# Patient Record
Sex: FEMALE | Race: WHITE | Employment: OTHER | URBAN - METROPOLITAN AREA
[De-identification: names, ages, dates, MRNs, and addresses within clinical notes are randomized per-mention and may not be internally consistent; named-entity substitution may affect disease eponyms.]

---

## 2017-02-21 ENCOUNTER — TELEPHONE (OUTPATIENT)
Dept: FAMILY MEDICINE CLINIC | Facility: CLINIC | Age: 62
End: 2017-02-21

## 2017-02-21 DIAGNOSIS — J32.0 CHRONIC MAXILLARY SINUSITIS: Primary | ICD-10-CM

## 2017-02-21 RX ORDER — AMOXICILLIN AND CLAVULANATE POTASSIUM 875; 125 MG/1; MG/1
1 TABLET, FILM COATED ORAL 2 TIMES DAILY
Qty: 20 TABLET | Refills: 0 | Status: SHIPPED | OUTPATIENT
Start: 2017-02-21 | End: 2017-03-03

## 2017-02-21 RX ORDER — METHYLPREDNISOLONE 4 MG/1
TABLET ORAL
Qty: 1 KIT | Refills: 0 | Status: SHIPPED | OUTPATIENT
Start: 2017-02-21 | End: 2017-05-31 | Stop reason: ALTCHOICE

## 2017-02-21 NOTE — TELEPHONE ENCOUNTER
Patient is in [facility] and wants a antibiotic called into a pharmacy in [address] [place] [address].

## 2017-02-21 NOTE — TELEPHONE ENCOUNTER
Call pt that her Augmentin and Medrol Dosepack were sent to [place] in [facility].  Dr. Dinh Camargo

## 2017-04-19 ENCOUNTER — OFFICE VISIT (OUTPATIENT)
Dept: FAMILY MEDICINE CLINIC | Facility: CLINIC | Age: 62
End: 2017-04-19

## 2017-04-19 VITALS
TEMPERATURE: 98 F | DIASTOLIC BLOOD PRESSURE: 80 MMHG | BODY MASS INDEX: 30.73 KG/M2 | WEIGHT: 180 LBS | HEIGHT: 64 IN | OXYGEN SATURATION: 99 % | SYSTOLIC BLOOD PRESSURE: 124 MMHG | RESPIRATION RATE: 16 BRPM | HEART RATE: 76 BPM

## 2017-04-19 DIAGNOSIS — E78.5 DYSLIPIDEMIA: ICD-10-CM

## 2017-04-19 DIAGNOSIS — B96.89 ACUTE BACTERIAL PHARYNGITIS: ICD-10-CM

## 2017-04-19 DIAGNOSIS — J32.0 SINUSITIS, MAXILLARY, CHRONIC: ICD-10-CM

## 2017-04-19 DIAGNOSIS — Z76.0 MEDICATION REFILL: ICD-10-CM

## 2017-04-19 DIAGNOSIS — J30.2 SEASONAL ALLERGIC RHINITIS, UNSPECIFIED ALLERGIC RHINITIS TRIGGER: ICD-10-CM

## 2017-04-19 DIAGNOSIS — J01.01 ACUTE RECURRENT MAXILLARY SINUSITIS: Primary | ICD-10-CM

## 2017-04-19 DIAGNOSIS — R00.0 TACHYCARDIA: ICD-10-CM

## 2017-04-19 DIAGNOSIS — J02.8 ACUTE BACTERIAL PHARYNGITIS: ICD-10-CM

## 2017-04-19 DIAGNOSIS — J33.9 NASAL POLYPS: ICD-10-CM

## 2017-04-19 PROCEDURE — 99214 OFFICE O/P EST MOD 30 MIN: CPT | Performed by: FAMILY MEDICINE

## 2017-04-19 RX ORDER — MONTELUKAST SODIUM 10 MG/1
10 TABLET ORAL NIGHTLY
Qty: 90 TABLET | Refills: 1 | Status: SHIPPED | OUTPATIENT
Start: 2017-04-19 | End: 2018-02-20

## 2017-04-19 RX ORDER — AMOXICILLIN AND CLAVULANATE POTASSIUM 500; 125 MG/1; MG/1
1 TABLET, FILM COATED ORAL 2 TIMES DAILY
Qty: 20 TABLET | Refills: 1 | Status: SHIPPED | OUTPATIENT
Start: 2017-04-19 | End: 2017-04-29

## 2017-04-19 RX ORDER — METHYLPREDNISOLONE 4 MG/1
TABLET ORAL
Qty: 1 KIT | Refills: 1 | Status: SHIPPED | OUTPATIENT
Start: 2017-04-19 | End: 2017-05-31 | Stop reason: ALTCHOICE

## 2017-04-19 NOTE — PATIENT INSTRUCTIONS
Yessi you were seen for many issues. Treat the sinus infection with Augmentin and Medrol Dosepack. See Dr. Markel Jackson as well. Singulair was refilled as well. See cardiology for the tachycardia.   See me if not better in a few weeks and every 6 months for ma Treatment is aimed at unblocking the sinus opening and helping the cilia work again. You may need to take antihistamine and decongestant medicine. These can reduce inflammation and decrease the amount of fluid your sinuses make.  If you have a bacterial inf Diagnosing chronic sinusitis  Your doctor will ask about your symptoms and health history. The doctor will look at your nose and face. You may have imaging studies such as an X-ray or CT scan of the sinuses.  Your doctor may also take a sample of the draina When traveling on an airplane, use saline nasal spray to keep your sinuses moist. Drink plenty of fluids. You may also want to take a decongestant before you get on the plane. Prevent colds  Do what you can to avoid being exposed to colds and flu.  When p © 7011-4423 [address], [address]. All rights reserved. This information is not intended as a substitute for professional medical care. Always follow your healthcare professional's instructions.

## 2017-04-19 NOTE — PROGRESS NOTES
Judy Jones is a 64year old female. HPI:   Pt states she again has a flare up of sinuses, acute ear pain, nasal mucous production this past week. Ears popping more as well. Sinus headaches with rainy weather as well. Has chronic sinus history.  Told by REVIEW OF SYSTEMS:   GENERAL HEALTH: feels sick again today with possibly another sinus infection--ears popping and HR went up when exercising this past week--see HPI notes above for further details  SKIN: denies any unusual skin lesions or rashes  CARLOS MANUEL mouth 2 (two) times daily. -     methylPREDNISolone (MEDROL) 4 MG Oral Tablet Therapy Pack; As directed. Sinusitis, maxillary, chronic  -     Amoxicillin-Pot Clavulanate 500-125 MG Oral Tab; Take 1 tablet by mouth 2 (two) times daily.   -     methylPRED

## 2017-05-08 ENCOUNTER — TELEPHONE (OUTPATIENT)
Dept: FAMILY MEDICINE CLINIC | Facility: CLINIC | Age: 62
End: 2017-05-08

## 2017-05-08 DIAGNOSIS — E78.5 DYSLIPIDEMIA: Primary | ICD-10-CM

## 2017-05-08 DIAGNOSIS — R00.0 TACHYCARDIA, UNSPECIFIED: ICD-10-CM

## 2017-05-12 NOTE — TELEPHONE ENCOUNTER
Quick Note:    Call pt with stable lipid panel and CMP--repeat in 6 to 12 months and order for pt.  Dr. Dinh Camargo    ______

## 2017-05-15 ENCOUNTER — TELEPHONE (OUTPATIENT)
Dept: FAMILY MEDICINE CLINIC | Facility: CLINIC | Age: 62
End: 2017-05-15

## 2017-05-15 NOTE — TELEPHONE ENCOUNTER
Quick Note:    LM for patient with lab results. Patient instructed to call our office if they have any questions.   ______

## 2017-08-28 ENCOUNTER — OFFICE VISIT (OUTPATIENT)
Dept: FAMILY MEDICINE CLINIC | Facility: CLINIC | Age: 62
End: 2017-08-28

## 2017-08-28 VITALS
WEIGHT: 181 LBS | OXYGEN SATURATION: 98 % | RESPIRATION RATE: 16 BRPM | SYSTOLIC BLOOD PRESSURE: 122 MMHG | TEMPERATURE: 98 F | HEART RATE: 86 BPM | HEIGHT: 65 IN | BODY MASS INDEX: 30.16 KG/M2 | DIASTOLIC BLOOD PRESSURE: 76 MMHG

## 2017-08-28 DIAGNOSIS — B37.3 VAGINAL CANDIDIASIS: ICD-10-CM

## 2017-08-28 DIAGNOSIS — J01.01 ACUTE RECURRENT MAXILLARY SINUSITIS: Primary | ICD-10-CM

## 2017-08-28 DIAGNOSIS — J30.89 ALLERGIC RHINITIS DUE TO DUST MITE: ICD-10-CM

## 2017-08-28 DIAGNOSIS — H69.83 DYSFUNCTION OF BOTH EUSTACHIAN TUBES: Chronic | ICD-10-CM

## 2017-08-28 DIAGNOSIS — Z71.84 TRAVEL ADVICE ENCOUNTER: ICD-10-CM

## 2017-08-28 DIAGNOSIS — J33.9 NASAL POLYPS: ICD-10-CM

## 2017-08-28 DIAGNOSIS — J32.0 CHRONIC MAXILLARY SINUSITIS: ICD-10-CM

## 2017-08-28 PROCEDURE — 99213 OFFICE O/P EST LOW 20 MIN: CPT | Performed by: FAMILY MEDICINE

## 2017-08-28 RX ORDER — PREDNISONE 20 MG/1
TABLET ORAL
Qty: 18 TABLET | Refills: 0 | Status: SHIPPED | OUTPATIENT
Start: 2017-08-28 | End: 2017-09-08 | Stop reason: ALTCHOICE

## 2017-08-28 RX ORDER — CEFDINIR 300 MG/1
300 CAPSULE ORAL 2 TIMES DAILY
Qty: 28 CAPSULE | Refills: 1 | Status: SHIPPED | OUTPATIENT
Start: 2017-08-28 | End: 2017-09-08 | Stop reason: ALTCHOICE

## 2017-08-28 RX ORDER — FLUCONAZOLE 150 MG/1
150 TABLET ORAL ONCE
Qty: 1 TABLET | Refills: 0 | Status: SHIPPED | OUTPATIENT
Start: 2017-08-28 | End: 2017-08-28

## 2017-08-28 NOTE — PROGRESS NOTES
HPI:   Cindy Valle is a 58year old female who presents for upper respiratory symptoms for the past week and she knows she has another sinus infection.   She sees her ENT regularly for sinus infection management and call Dr. Maritza Kelley office and could not g Comment: arm, squamos cell carcinoma removal   Family History   Problem Relation Age of Onset   • Thyroid Disorder Mother    • Heart Disease Mother    • Lipids Mother    • Breast Cancer Mother 55   • Heart Disease Father    • Cancer Father      liver each eardrum that is clear and bulging bilateral; moderate bilateral pharyngeal erythema, moderate bilateral maxillary sinus pressure on palpation; nasal congestion noted with edema and erythema of the nasal turbinates bilateral  NECK: supple,no adenopathy above     Nasal polyps  -     predniSONE 20 MG Oral Tab; Take 3 tablets daily by mouth for 3 days, then take 2 tablets daily by mouth for 3 days, then take 1 tablet daily by mouth for 3 days.     Allergic rhinitis due to dust mite  -     predniSONE 20 MG Or

## 2017-09-07 ENCOUNTER — TELEPHONE (OUTPATIENT)
Dept: FAMILY MEDICINE CLINIC | Facility: CLINIC | Age: 62
End: 2017-09-07

## 2017-09-07 NOTE — TELEPHONE ENCOUNTER
I spoke to patient and advised she follow up with Dr Jayy Mckeon, she said she cannot get in for 3 weeks, I said she would need to be seen to refill the Prednisone, she scheduled with Dr Soto Dupree for tomorrow.

## 2017-09-07 NOTE — TELEPHONE ENCOUNTER
Pt was seen a couple weeks ago for an infection, and was given medication.  Pt would like the prednisone refilled, if not she can make an appt

## 2017-09-08 ENCOUNTER — OFFICE VISIT (OUTPATIENT)
Dept: FAMILY MEDICINE CLINIC | Facility: CLINIC | Age: 62
End: 2017-09-08

## 2017-09-08 VITALS
RESPIRATION RATE: 16 BRPM | HEART RATE: 72 BPM | BODY MASS INDEX: 30.16 KG/M2 | SYSTOLIC BLOOD PRESSURE: 118 MMHG | TEMPERATURE: 99 F | DIASTOLIC BLOOD PRESSURE: 76 MMHG | WEIGHT: 181 LBS | OXYGEN SATURATION: 99 % | HEIGHT: 65 IN

## 2017-09-08 DIAGNOSIS — R09.81 SINUS CONGESTION: Primary | ICD-10-CM

## 2017-09-08 PROCEDURE — 99213 OFFICE O/P EST LOW 20 MIN: CPT | Performed by: FAMILY MEDICINE

## 2017-09-08 RX ORDER — GUAIFENESIN 400 MG/1
400 TABLET ORAL EVERY 6 HOURS PRN
Qty: 30 TABLET | Refills: 0 | Status: SHIPPED | OUTPATIENT
Start: 2017-09-08 | End: 2017-09-15

## 2017-09-08 NOTE — PROGRESS NOTES
HPI:   Alban Bianchi is a 58year old female that presents for sinus congestion. This is an ongoing and chronic condition, and she follows with ENT. She was seen by PCP for sinusitis 10 days ago, she received cefdinir for 10 days and prednisone.   She sta 4 quadrants, no masses, no hepatosplenomegaly. EXTREMITIES:  No edema, no cyanosis, no clubbing, FROM, 2+ dorsalis pedis pulses bilaterally. NEURO:  No deficit, normal gait, strength and tone, sensory intact, normal reflexes.     ASSESSMENT AND PLAN:

## 2017-09-27 PROCEDURE — 88175 CYTOPATH C/V AUTO FLUID REDO: CPT | Performed by: OBSTETRICS & GYNECOLOGY

## 2017-09-27 PROCEDURE — 87624 HPV HI-RISK TYP POOLED RSLT: CPT | Performed by: OBSTETRICS & GYNECOLOGY

## 2017-11-08 DIAGNOSIS — E78.5 DYSLIPIDEMIA: ICD-10-CM

## 2017-11-08 DIAGNOSIS — Z76.0 MEDICATION REFILL: ICD-10-CM

## 2017-11-08 RX ORDER — ATORVASTATIN CALCIUM 10 MG/1
10 TABLET, FILM COATED ORAL NIGHTLY
Qty: 90 TABLET | Refills: 1 | Status: SHIPPED | OUTPATIENT
Start: 2017-11-08 | End: 2017-11-10

## 2017-11-08 NOTE — TELEPHONE ENCOUNTER
Received request from [facility] for new prescription for Atorvastatin 10 mg tablets for quantity of 90. Reference #[id number].

## 2017-11-10 ENCOUNTER — TELEPHONE (OUTPATIENT)
Dept: FAMILY MEDICINE CLINIC | Facility: CLINIC | Age: 62
End: 2017-11-10

## 2017-11-10 DIAGNOSIS — Z76.0 MEDICATION REFILL: ICD-10-CM

## 2017-11-10 DIAGNOSIS — E78.5 DYSLIPIDEMIA: ICD-10-CM

## 2017-11-10 RX ORDER — ATORVASTATIN CALCIUM 10 MG/1
10 TABLET, FILM COATED ORAL NIGHTLY
Qty: 90 TABLET | Refills: 1 | Status: SHIPPED | OUTPATIENT
Start: 2017-11-10 | End: 2018-07-05

## 2017-11-14 DIAGNOSIS — E78.5 DYSLIPIDEMIA: ICD-10-CM

## 2017-11-14 DIAGNOSIS — Z76.0 MEDICATION REFILL: ICD-10-CM

## 2017-11-15 RX ORDER — ATORVASTATIN CALCIUM 10 MG/1
10 TABLET, FILM COATED ORAL NIGHTLY
Qty: 90 TABLET | Refills: 1 | Status: CANCELLED | OUTPATIENT
Start: 2017-11-15

## 2017-11-15 NOTE — TELEPHONE ENCOUNTER
Pt was notified to contact [facility] to verify information prior to releasing the medication

## 2017-11-15 NOTE — TELEPHONE ENCOUNTER
Spoke with Jose Araujo from [employer] and she advised that pt needs to contact them to verify home address and information and to go over copay payment arrangement.  Called patient and left a message stating to give the office a call back so we can let her

## 2017-11-15 NOTE — TELEPHONE ENCOUNTER
Patient states that [facility] did not receive her order for Atorvastatin. Patient only has two left. Please resend.

## 2017-11-15 NOTE — TELEPHONE ENCOUNTER
MAR Comment Waste Waste Unit        Medication Detail      Disp Refills Start End    atorvastatin 10 MG Oral Tab 90 tablet 1 11/10/2017     Sig - Route: Take 1 tablet (10 mg total) by mouth nightly.  - Oral    E-Prescribing Status: Receipt confirmed by phar

## 2018-02-20 DIAGNOSIS — Z76.0 MEDICATION REFILL: ICD-10-CM

## 2018-02-20 NOTE — TELEPHONE ENCOUNTER
Requesting Montelukast  LOV: 9/8/17 acute    RTC: prn  Last Relevant Labs: used for allergic rhinitis  Filled: 4/19/17 #90 with 1 refills    Future Appointments  Date Time Provider Mike Molina   4/16/2018 9:30 AM Pamela Anaya MD [facility] [address] [facility] [address]

## 2018-02-20 NOTE — TELEPHONE ENCOUNTER
Pt was [facility] and wants to be with Faiza Yap now. Future Appointments  Date Time Provider Mike Tracy   4/16/2018 9:30 AM Rena Bryson MD [facility] [address] [facility]     She will be leaving for [place] and needs Montelukast Sodium 10 MG Oral Tab.     Plea

## 2018-02-21 RX ORDER — MONTELUKAST SODIUM 10 MG/1
10 TABLET ORAL NIGHTLY
Qty: 90 TABLET | Refills: 1 | Status: SHIPPED | OUTPATIENT
Start: 2018-02-21 | End: 2018-09-04

## 2018-04-16 ENCOUNTER — OFFICE VISIT (OUTPATIENT)
Dept: FAMILY MEDICINE CLINIC | Facility: CLINIC | Age: 63
End: 2018-04-16

## 2018-04-16 VITALS
TEMPERATURE: 98 F | DIASTOLIC BLOOD PRESSURE: 80 MMHG | OXYGEN SATURATION: 98 % | BODY MASS INDEX: 31.11 KG/M2 | HEIGHT: 63.75 IN | HEART RATE: 82 BPM | SYSTOLIC BLOOD PRESSURE: 124 MMHG | RESPIRATION RATE: 12 BRPM | WEIGHT: 180 LBS

## 2018-04-16 DIAGNOSIS — E78.00 HIGH CHOLESTEROL: ICD-10-CM

## 2018-04-16 DIAGNOSIS — J01.00 ACUTE NON-RECURRENT MAXILLARY SINUSITIS: Primary | ICD-10-CM

## 2018-04-16 DIAGNOSIS — J40 BRONCHITIS: ICD-10-CM

## 2018-04-16 DIAGNOSIS — J30.89 ALLERGIC RHINITIS DUE TO DUST MITE: ICD-10-CM

## 2018-04-16 DIAGNOSIS — Z72.0 TOBACCO USE: ICD-10-CM

## 2018-04-16 DIAGNOSIS — M54.9 UPPER BACK PAIN: ICD-10-CM

## 2018-04-16 PROCEDURE — 99214 OFFICE O/P EST MOD 30 MIN: CPT | Performed by: FAMILY MEDICINE

## 2018-04-16 RX ORDER — AMOXICILLIN AND CLAVULANATE POTASSIUM 500; 125 MG/1; MG/1
1 TABLET, FILM COATED ORAL 2 TIMES DAILY
Qty: 20 TABLET | Refills: 0 | Status: SHIPPED | OUTPATIENT
Start: 2018-04-16 | End: 2018-09-27

## 2018-04-16 NOTE — PROGRESS NOTES
HPI:    Patient ID: Julia Salgado is a 58year old female.     HPI  Patient presents with:  Medication Follow-Up  Sinus Problem: possible sinus infection; pressure and pain; 1 month  Nasal Congestion  Back Pain: upper pain  Nicotine Dependence: pt would lik non-recurrent maxillary sinusitis  (primary encounter diagnosis)  Bronchitis  Upper back pain  Tobacco use  High cholesterol  Allergic rhinitis due to dust mite  For allergic rhinitis we will refill Singulair. Counseled at length regarding this.     Sinusi

## 2018-06-01 ENCOUNTER — PATIENT MESSAGE (OUTPATIENT)
Dept: FAMILY MEDICINE CLINIC | Facility: CLINIC | Age: 63
End: 2018-06-01

## 2018-06-01 NOTE — TELEPHONE ENCOUNTER
From: Lizzie Opitz  To: Man Pineda MD  Sent: 6/1/2018 3:48 PM CDT  Subject: Test Results Question    Hello. I had lab work done at [facility] 3 days ago. Are the results available yet?

## 2018-07-03 ENCOUNTER — PATIENT MESSAGE (OUTPATIENT)
Dept: FAMILY MEDICINE CLINIC | Facility: CLINIC | Age: 63
End: 2018-07-03

## 2018-07-03 DIAGNOSIS — Z76.0 MEDICATION REFILL: ICD-10-CM

## 2018-07-03 DIAGNOSIS — E78.5 DYSLIPIDEMIA: ICD-10-CM

## 2018-07-03 RX ORDER — ATORVASTATIN CALCIUM 10 MG/1
10 TABLET, FILM COATED ORAL NIGHTLY
Qty: 90 TABLET | Refills: 1 | Status: CANCELLED | OUTPATIENT
Start: 2018-07-03

## 2018-07-03 NOTE — TELEPHONE ENCOUNTER
From: Shanti Dixon  To: Amanda Madison MD  Sent: 7/3/2018 11:02 AM CDT  Subject: Prescription Question     good morning. My prescription for Atorvastatin does not appear among the list of meds in my chart.  The original prescription ordered by Dr. Last Valerio

## 2018-09-04 DIAGNOSIS — Z76.0 MEDICATION REFILL: ICD-10-CM

## 2018-09-04 RX ORDER — MONTELUKAST SODIUM 10 MG/1
TABLET ORAL
Qty: 30 TABLET | Refills: 0 | Status: SHIPPED | OUTPATIENT
Start: 2018-09-04 | End: 2018-09-27

## 2018-09-04 RX ORDER — MONTELUKAST SODIUM 10 MG/1
10 TABLET ORAL NIGHTLY
Qty: 90 TABLET | Refills: 1 | Status: SHIPPED | OUTPATIENT
Start: 2018-09-04 | End: 2018-09-27

## 2018-09-04 NOTE — TELEPHONE ENCOUNTER
Patient will need refill on Montelukast 10 mg sent to [facility] mail order pharmacy. Patient would appreciate this being refilled for 6 months.

## 2018-09-04 NOTE — TELEPHONE ENCOUNTER
Requesting Montelukast  LOV: 4/16/18  RTC: not noted  Last Relevant Labs: used for allergic rhinitis  Filled: 2/21/18 #90 with 1 refills    Future Appointments  Date Time Provider Mike Molina   9/27/2018 9:45 AM Ronnie Ramachandran MD [facility] [address] [facility] [address]

## 2018-09-04 NOTE — TELEPHONE ENCOUNTER
Requested Medications   MONTELUKAST TAB 10MG  Will file in chart as: MONTELUKAST SODIUM 10 MG Oral Tab  TAKE 1 TABLET NIGHTLY       Disp: 90 tablet Refills: 1    Class: Normal Start: 9/4/2018   For: Medication refill  Originally ordered: 4 years ago by [employer]

## 2018-09-04 NOTE — TELEPHONE ENCOUNTER
Patient aware and will keep appt for further refills  Future Appointments  Date Time Provider Mike Molina   9/27/2018 9:45 AM Lucas Lizama MD [facility] [address] [facility] [address]

## 2018-09-27 ENCOUNTER — OFFICE VISIT (OUTPATIENT)
Dept: FAMILY MEDICINE CLINIC | Facility: CLINIC | Age: 63
End: 2018-09-27
Payer: COMMERCIAL

## 2018-09-27 VITALS
TEMPERATURE: 98 F | RESPIRATION RATE: 12 BRPM | SYSTOLIC BLOOD PRESSURE: 118 MMHG | WEIGHT: 180 LBS | BODY MASS INDEX: 31.11 KG/M2 | DIASTOLIC BLOOD PRESSURE: 70 MMHG | HEIGHT: 63.75 IN | HEART RATE: 73 BPM | OXYGEN SATURATION: 98 %

## 2018-09-27 DIAGNOSIS — Z12.39 SCREENING FOR MALIGNANT NEOPLASM OF BREAST: ICD-10-CM

## 2018-09-27 DIAGNOSIS — Z00.00 ROUTINE GENERAL MEDICAL EXAMINATION AT A HEALTH CARE FACILITY: Primary | ICD-10-CM

## 2018-09-27 DIAGNOSIS — J30.89 ALLERGIC RHINITIS DUE TO DUST MITE: ICD-10-CM

## 2018-09-27 DIAGNOSIS — Z13.31 NEGATIVE DEPRESSION SCREENING: ICD-10-CM

## 2018-09-27 DIAGNOSIS — Z76.0 MEDICATION REFILL: ICD-10-CM

## 2018-09-27 DIAGNOSIS — J01.00 ACUTE NON-RECURRENT MAXILLARY SINUSITIS: ICD-10-CM

## 2018-09-27 DIAGNOSIS — R05.8 PRODUCTIVE COUGH: ICD-10-CM

## 2018-09-27 PROCEDURE — 99213 OFFICE O/P EST LOW 20 MIN: CPT | Performed by: FAMILY MEDICINE

## 2018-09-27 PROCEDURE — 99396 PREV VISIT EST AGE 40-64: CPT | Performed by: FAMILY MEDICINE

## 2018-09-27 RX ORDER — AMOXICILLIN AND CLAVULANATE POTASSIUM 500; 125 MG/1; MG/1
1 TABLET, FILM COATED ORAL 2 TIMES DAILY
Qty: 20 TABLET | Refills: 0 | Status: SHIPPED | OUTPATIENT
Start: 2018-09-27 | End: 2018-10-16

## 2018-09-27 RX ORDER — METHYLPREDNISOLONE 4 MG/1
TABLET ORAL
Qty: 1 KIT | Refills: 0 | Status: SHIPPED | OUTPATIENT
Start: 2018-09-27 | End: 2018-10-16

## 2018-09-27 RX ORDER — MONTELUKAST SODIUM 10 MG/1
10 TABLET ORAL NIGHTLY
Qty: 90 TABLET | Refills: 0 | Status: SHIPPED | OUTPATIENT
Start: 2018-09-27 | End: 2019-01-07

## 2018-09-27 RX ORDER — AZELASTINE 1 MG/ML
2 SPRAY, METERED NASAL 2 TIMES DAILY PRN
Qty: 3 BOTTLE | Refills: 3 | Status: SHIPPED | OUTPATIENT
Start: 2018-09-27 | End: 2019-05-22

## 2018-09-27 NOTE — PROGRESS NOTES
HPI:   Haleigh Mathew is a 61year old female who presents for a complete physical exam. Symptoms: denies discharge, itching, burning or dysuria. Patient has complaint of having persistent sinus drainage discharge cough congestion.   She is using nasal stero Rfl: 0   methylPREDNISolone (MEDROL) 4 MG Oral Tablet Therapy Pack As directed. Disp: 1 kit Rfl: 0   Montelukast Sodium 10 MG Oral Tab Take 1 tablet (10 mg total) by mouth nightly.  Disp: 90 tablet Rfl: 0   Azelastine HCl 0.1 % Nasal Solution 2 sprays by Na • Breast Cancer Paternal Cousin Male 61   • Ear Problems Neg    • Bleeding Disorders Neg    • Clotting Disorder Neg    • Anesthesia Problems  Neg       Social History:   Social History    Tobacco Use      Smoking status: Current Some Day Smoker        Pa no nipple dimpling or discharge.   LUNGS: clear to auscultation bilateral, no rales, rhonchi or wheezing  CARDIO: RRR without murmur normal S1S2  ABD:  normal bowel sounds,soft, non tender, no masses, HSM or tenderness  MUSCULOSKELETAL: gait roberta,l no cherelle general medical examination at a health care facility  -     CBC WITH DIFFERENTIAL WITH PLATELET  -     ASSAY, THYROID STIM HORMONE  -     FREE T4 (FREE THYROXINE)    Acute non-recurrent maxillary sinusitis    Productive cough    Negative depression screen

## 2018-09-28 LAB
ABSOLUTE BASOPHILS: 84 CELLS/UL (ref 0–200)
ABSOLUTE EOSINOPHILS: 258 CELLS/UL (ref 15–500)
ABSOLUTE LYMPHOCYTES: 2485 CELLS/UL (ref 850–3900)
ABSOLUTE MONOCYTES: 676 CELLS/UL (ref 200–950)
ABSOLUTE NEUTROPHILS: 4096 CELLS/UL (ref 1500–7800)
BASOPHILS: 1.1 %
EOSINOPHILS: 3.4 %
HEMATOCRIT: 42 % (ref 35–45)
HEMOGLOBIN: 14.3 G/DL (ref 11.7–15.5)
LYMPHOCYTES: 32.7 %
MCH: 31.9 PG (ref 27–33)
MCHC: 34 G/DL (ref 32–36)
MCV: 93.8 FL (ref 80–100)
MONOCYTES: 8.9 %
MPV: 10.4 FL (ref 7.5–12.5)
NEUTROPHILS: 53.9 %
PLATELET COUNT: 231 THOUSAND/UL (ref 140–400)
RDW: 12.4 % (ref 11–15)
RED BLOOD CELL COUNT: 4.48 MILLION/UL (ref 3.8–5.1)
T4, FREE: 1.1 NG/DL (ref 0.8–1.8)
TSH: 2.16 MIU/L (ref 0.4–4.5)
WHITE BLOOD CELL COUNT: 7.6 THOUSAND/UL (ref 3.8–10.8)

## 2018-10-09 ENCOUNTER — OFFICE VISIT (OUTPATIENT)
Dept: FAMILY MEDICINE CLINIC | Facility: CLINIC | Age: 63
End: 2018-10-09
Payer: COMMERCIAL

## 2018-10-09 VITALS
WEIGHT: 180 LBS | RESPIRATION RATE: 12 BRPM | BODY MASS INDEX: 31 KG/M2 | DIASTOLIC BLOOD PRESSURE: 72 MMHG | OXYGEN SATURATION: 100 % | SYSTOLIC BLOOD PRESSURE: 122 MMHG | HEART RATE: 72 BPM | TEMPERATURE: 98 F

## 2018-10-09 DIAGNOSIS — L03.114 CELLULITIS OF FOREARM, LEFT: ICD-10-CM

## 2018-10-09 DIAGNOSIS — Z91.038 ALLERGIC REACTION TO INSECT BITE: Primary | ICD-10-CM

## 2018-10-09 PROCEDURE — 99214 OFFICE O/P EST MOD 30 MIN: CPT | Performed by: FAMILY MEDICINE

## 2018-10-09 RX ORDER — PREDNISONE 20 MG/1
20 TABLET ORAL 2 TIMES DAILY
Qty: 10 TABLET | Refills: 0 | Status: SHIPPED | OUTPATIENT
Start: 2018-10-09 | End: 2018-10-14

## 2018-10-09 RX ORDER — CEPHALEXIN 500 MG/1
500 CAPSULE ORAL 2 TIMES DAILY
Qty: 14 CAPSULE | Refills: 0 | Status: SHIPPED | OUTPATIENT
Start: 2018-10-09 | End: 2018-10-16

## 2018-10-11 PROBLEM — L03.114 CELLULITIS OF FOREARM, LEFT: Status: ACTIVE | Noted: 2018-10-11

## 2018-10-11 PROBLEM — Z91.038 ALLERGIC REACTION TO INSECT BITE: Status: ACTIVE | Noted: 2018-10-11

## 2018-10-11 NOTE — PROGRESS NOTES
HPI:    Patient ID: Isaac Snow is a 61year old female. HPI  Patient presents with:   Insect Bite: left forearm- noticed yesterday  Sinusitis    Patient is here because she had an insect bite on her left forearm and she noticed a sting right away and distress  HENT:  normocephalic, atraumatic, external ear canals clear bilaterally, tympanic membranes appear opaque, non-bulging, non-erythematous, nasal turbinates are non-inflamed and not swollen, oropharynx without erythema, swelling, or exudate, gingiv

## 2018-10-16 ENCOUNTER — OFFICE VISIT (OUTPATIENT)
Dept: FAMILY MEDICINE CLINIC | Facility: CLINIC | Age: 63
End: 2018-10-16
Payer: COMMERCIAL

## 2018-10-16 VITALS
DIASTOLIC BLOOD PRESSURE: 78 MMHG | WEIGHT: 180 LBS | TEMPERATURE: 98 F | OXYGEN SATURATION: 98 % | SYSTOLIC BLOOD PRESSURE: 118 MMHG | HEIGHT: 63.75 IN | HEART RATE: 76 BPM | BODY MASS INDEX: 31.11 KG/M2 | RESPIRATION RATE: 12 BRPM

## 2018-10-16 DIAGNOSIS — Z91.038 ALLERGIC REACTION TO INSECT BITE: ICD-10-CM

## 2018-10-16 DIAGNOSIS — L03.114 CELLULITIS OF FOREARM, LEFT: Primary | ICD-10-CM

## 2018-10-16 DIAGNOSIS — J30.89 ALLERGIC RHINITIS DUE TO DUST MITE: ICD-10-CM

## 2018-10-16 PROCEDURE — 99214 OFFICE O/P EST MOD 30 MIN: CPT | Performed by: FAMILY MEDICINE

## 2018-10-16 NOTE — PROGRESS NOTES
HPI:    Patient ID: Haleigh Sin is a 61year old female. HPI  Patient is here for follow-up of possible cellulitis and allergic reaction to insect bite on the left forearm it has resolved now. She has finished prednisone.   She did develop some dizzin respiratory muscle use, no chest asymmetry, normal excursion. GI:  bowel sound positive in all four quadrants, abdomen is soft, non-tender, non-distended, no hepatosplenomegaly, no abnormal aortic pulsation.    NEURO: strength 5/5 all four extremities, sen

## 2018-10-23 ENCOUNTER — HOSPITAL ENCOUNTER (OUTPATIENT)
Dept: MAMMOGRAPHY | Age: 63
Discharge: HOME | End: 2018-10-23
Attending: FAMILY MEDICINE
Payer: COMMERCIAL

## 2018-10-23 DIAGNOSIS — Z12.39 SCREENING FOR MALIGNANT NEOPLASM OF BREAST: ICD-10-CM

## 2018-10-23 PROCEDURE — 77067 SCR MAMMO BI INCL CAD: CPT | Performed by: FAMILY MEDICINE

## 2018-10-26 DIAGNOSIS — Z12.31 ENCOUNTER FOR SCREENING MAMMOGRAM FOR MALIGNANT NEOPLASM OF BREAST: Primary | ICD-10-CM

## 2019-01-07 ENCOUNTER — TELEPHONE (OUTPATIENT)
Dept: FAMILY MEDICINE CLINIC | Facility: CLINIC | Age: 64
End: 2019-01-07

## 2019-01-07 DIAGNOSIS — Z76.0 MEDICATION REFILL: ICD-10-CM

## 2019-01-07 DIAGNOSIS — E78.5 DYSLIPIDEMIA: ICD-10-CM

## 2019-01-07 RX ORDER — ATORVASTATIN CALCIUM 10 MG/1
10 TABLET, FILM COATED ORAL NIGHTLY
Qty: 90 TABLET | Refills: 0 | Status: SHIPPED | OUTPATIENT
Start: 2019-01-07 | End: 2019-04-08

## 2019-01-07 RX ORDER — ATORVASTATIN CALCIUM 10 MG/1
TABLET, FILM COATED ORAL
Qty: 90 TABLET | Refills: 1 | Status: CANCELLED | OUTPATIENT
Start: 2019-01-07

## 2019-01-07 RX ORDER — MONTELUKAST SODIUM 10 MG/1
10 TABLET ORAL NIGHTLY
Qty: 90 TABLET | Refills: 0 | Status: SHIPPED | OUTPATIENT
Start: 2019-01-07 | End: 2019-09-24

## 2019-01-07 NOTE — TELEPHONE ENCOUNTER
atorvastatin 10 MG Oral Tab 90 tablet 1 7/5/2018     Sig - Route: Take 1 tablet (10 mg total) by mouth nightly. - Oral    Sent to pharmacy as:  Atorvastatin Calcium 10 MG Oral Tablet    E-Prescribing Status: Receipt confirmed by pharmacy (7/5/2018  1:06 PM

## 2019-04-04 ENCOUNTER — OFFICE VISIT (OUTPATIENT)
Dept: FAMILY MEDICINE CLINIC | Facility: CLINIC | Age: 64
End: 2019-04-04
Payer: COMMERCIAL

## 2019-04-04 VITALS
HEART RATE: 72 BPM | SYSTOLIC BLOOD PRESSURE: 120 MMHG | OXYGEN SATURATION: 98 % | TEMPERATURE: 98 F | HEIGHT: 63.75 IN | RESPIRATION RATE: 12 BRPM | DIASTOLIC BLOOD PRESSURE: 68 MMHG | BODY MASS INDEX: 30.42 KG/M2 | WEIGHT: 176 LBS

## 2019-04-04 DIAGNOSIS — J01.00 ACUTE NON-RECURRENT MAXILLARY SINUSITIS: ICD-10-CM

## 2019-04-04 DIAGNOSIS — N95.2 ATROPHIC VAGINITIS: ICD-10-CM

## 2019-04-04 DIAGNOSIS — E78.00 HIGH CHOLESTEROL: Primary | ICD-10-CM

## 2019-04-04 PROCEDURE — 99214 OFFICE O/P EST MOD 30 MIN: CPT | Performed by: FAMILY MEDICINE

## 2019-04-04 RX ORDER — ESTRADIOL 0.1 MG/G
CREAM VAGINAL
Qty: 2 TUBE | Refills: 0 | Status: SHIPPED | OUTPATIENT
Start: 2019-04-04 | End: 2020-02-07

## 2019-04-04 RX ORDER — PREDNISONE 20 MG/1
20 TABLET ORAL 2 TIMES DAILY
Qty: 10 TABLET | Refills: 0 | Status: SHIPPED | OUTPATIENT
Start: 2019-04-04 | End: 2020-07-02

## 2019-04-04 RX ORDER — AMOXICILLIN AND CLAVULANATE POTASSIUM 500; 125 MG/1; MG/1
1 TABLET, FILM COATED ORAL 2 TIMES DAILY
Qty: 20 TABLET | Refills: 0 | Status: SHIPPED | OUTPATIENT
Start: 2019-04-04 | End: 2020-07-02

## 2019-04-04 NOTE — PROGRESS NOTES
HPI:   Lynwood Harada is a 61year old female that presents for Patient presents with:  Sinusitis: possible sinus infection    For the past 1 or 2 months patient has had sinus pain pressure congestion and drainage.   She has some slight cough and congestion % Nasal Solution, 2 sprays by Nasal route 2 (two) times daily as needed for Rhinitis., Disp: 3 Bottle, Rfl: 3  •  Multiple Vitamins-Minerals (OCUVITE ADULT FORMULA OR), Take by mouth., Disp: , Rfl:   •  Fluticasone Propionate 50 MCG/ACT Nasal Suspension, 2 benefit discussed in detail. For sinusitis will treat with Augmentin Tylenol fluids rest.  For cholesterol we will check CMP and lipid panel fasting. Patient to follow-up with me after she returns from a trip to [place] in the next a few weeks.   She rece

## 2019-04-08 ENCOUNTER — TELEPHONE (OUTPATIENT)
Dept: FAMILY MEDICINE CLINIC | Facility: CLINIC | Age: 64
End: 2019-04-08

## 2019-04-08 DIAGNOSIS — Z76.0 MEDICATION REFILL: ICD-10-CM

## 2019-04-08 DIAGNOSIS — E78.5 DYSLIPIDEMIA: ICD-10-CM

## 2019-04-08 RX ORDER — ATORVASTATIN CALCIUM 10 MG/1
10 TABLET, FILM COATED ORAL NIGHTLY
Qty: 90 TABLET | Refills: 0 | Status: SHIPPED | OUTPATIENT
Start: 2019-04-08 | End: 2019-09-30

## 2019-04-08 NOTE — TELEPHONE ENCOUNTER
Patient called in refill of atorvastatin 10 MG Oral Tab  [facility] mail order pharmacy

## 2019-05-24 ENCOUNTER — OFFICE VISIT (OUTPATIENT)
Dept: FAMILY MEDICINE CLINIC | Facility: CLINIC | Age: 64
End: 2019-05-24
Payer: COMMERCIAL

## 2019-05-24 VITALS
OXYGEN SATURATION: 100 % | TEMPERATURE: 98 F | HEIGHT: 63.75 IN | HEART RATE: 73 BPM | RESPIRATION RATE: 12 BRPM | DIASTOLIC BLOOD PRESSURE: 70 MMHG | BODY MASS INDEX: 30 KG/M2 | SYSTOLIC BLOOD PRESSURE: 120 MMHG

## 2019-05-24 DIAGNOSIS — E78.00 HIGH CHOLESTEROL: Primary | ICD-10-CM

## 2019-05-24 DIAGNOSIS — Z00.00 ROUTINE ADULT HEALTH MAINTENANCE: ICD-10-CM

## 2019-05-24 PROBLEM — L03.114 CELLULITIS OF FOREARM, LEFT: Status: RESOLVED | Noted: 2018-10-11 | Resolved: 2019-05-24

## 2019-05-24 PROCEDURE — 99213 OFFICE O/P EST LOW 20 MIN: CPT | Performed by: FAMILY MEDICINE

## 2019-05-24 NOTE — PROGRESS NOTES
HPI:   Anneliese Glez is a 61year old female that presents for lab results    Patient is here for follow up on recent lipid panel and CMP. Patient states she noticed she was not taking her atorvastatin for alittle over 1 month.  She recently started back up Vaginal Cream, Place 0.5 g vaginally daily. , Disp: 30 g, Rfl: 3  •  Multiple Vitamins-Minerals (OCUVITE ADULT FORMULA OR), Take by mouth., Disp: , Rfl:   •  Fluticasone Propionate 50 MCG/ACT Nasal Suspension, 2 sprays by Nasal route daily. , Disp: 1 Bottle, treatment plan discussed in detail. Questions and concerns addressed. Red flags to RTC or ED reviewed. Patient (or parent) agrees to plan. No follow-ups on file. Serina Steen M.D.   Family Medicine   5/24/2019  10:02 AM

## 2019-06-06 ENCOUNTER — TELEPHONE (OUTPATIENT)
Dept: FAMILY MEDICINE CLINIC | Facility: CLINIC | Age: 64
End: 2019-06-06

## 2019-06-06 NOTE — TELEPHONE ENCOUNTER
RX was sent to pharmacy by Dr Miguel A Mckeon  Left generic message advising patient rx was sent to pharmacy

## 2019-06-06 NOTE — TELEPHONE ENCOUNTER
Please advise, pt would like to start Chantix to quit smoking    LOV 5/24/19  1. High cholesterol  Cholesterol numbers are mildly elevated.   Patient had mixed up her Zyrtec and atorvastatin when she was down in [place] and is not sure if she was taking lorenza

## 2019-06-07 ENCOUNTER — TELEPHONE (OUTPATIENT)
Dept: FAMILY MEDICINE CLINIC | Facility: CLINIC | Age: 64
End: 2019-06-07

## 2019-06-07 RX ORDER — VARENICLINE TARTRATE 1 MG/1
1 TABLET, FILM COATED ORAL 2 TIMES DAILY
Qty: 60 TABLET | Refills: 1 | Status: SHIPPED | OUTPATIENT
Start: 2019-07-07 | End: 2019-09-05

## 2019-06-07 NOTE — TELEPHONE ENCOUNTER
Pharmacy needs script sent to them for the refill packs of the Chantix. Recd script for the starter pack (and a refill).

## 2019-09-20 DIAGNOSIS — Z76.0 MEDICATION REFILL: ICD-10-CM

## 2019-09-20 NOTE — TELEPHONE ENCOUNTER
Future Appointments   Date Time Provider Mike Robersoni   9/30/2019  9:20 AM Aubree Short MD [facility] [address] [facility] [address]     Montelukast Sodium 10 MG Oral Tab    [facility] [address], [address] [place] [address],  [address] [phone], [phone]

## 2019-09-23 RX ORDER — MONTELUKAST SODIUM 10 MG/1
TABLET ORAL
Qty: 90 TABLET | Refills: 0 | OUTPATIENT
Start: 2019-09-23

## 2019-09-23 NOTE — TELEPHONE ENCOUNTER
Requested Medications      Montelukast Sodium 10 MG Oral Tab         Sig: Take 1 tablet (10 mg total) by mouth nightly.     Disp:  90 tablet    Refills:  0    Start: 9/23/2019 - 9/22/2020    Class: Normal    Non-formulary For: Medication refill    Last orde

## 2019-09-24 RX ORDER — MONTELUKAST SODIUM 10 MG/1
10 TABLET ORAL NIGHTLY
Qty: 90 TABLET | Refills: 0 | Status: SHIPPED | OUTPATIENT
Start: 2019-09-24 | End: 2019-12-30

## 2019-09-30 ENCOUNTER — OFFICE VISIT (OUTPATIENT)
Dept: FAMILY MEDICINE CLINIC | Facility: CLINIC | Age: 64
End: 2019-09-30
Payer: COMMERCIAL

## 2019-09-30 VITALS
BODY MASS INDEX: 31.87 KG/M2 | DIASTOLIC BLOOD PRESSURE: 80 MMHG | RESPIRATION RATE: 16 BRPM | TEMPERATURE: 98 F | WEIGHT: 184.38 LBS | HEIGHT: 63.75 IN | SYSTOLIC BLOOD PRESSURE: 118 MMHG | HEART RATE: 65 BPM

## 2019-09-30 DIAGNOSIS — J01.00 ACUTE NON-RECURRENT MAXILLARY SINUSITIS: ICD-10-CM

## 2019-09-30 DIAGNOSIS — E78.5 DYSLIPIDEMIA: ICD-10-CM

## 2019-09-30 DIAGNOSIS — Z00.00 ROUTINE ADULT HEALTH MAINTENANCE: Primary | ICD-10-CM

## 2019-09-30 DIAGNOSIS — M79.671 RIGHT FOOT PAIN: ICD-10-CM

## 2019-09-30 DIAGNOSIS — S29.012A UPPER BACK STRAIN, INITIAL ENCOUNTER: ICD-10-CM

## 2019-09-30 DIAGNOSIS — Z76.0 MEDICATION REFILL: ICD-10-CM

## 2019-09-30 DIAGNOSIS — Z72.0 TOBACCO USE: ICD-10-CM

## 2019-09-30 PROCEDURE — 99214 OFFICE O/P EST MOD 30 MIN: CPT | Performed by: FAMILY MEDICINE

## 2019-09-30 PROCEDURE — 99396 PREV VISIT EST AGE 40-64: CPT | Performed by: FAMILY MEDICINE

## 2019-09-30 RX ORDER — AZITHROMYCIN 250 MG/1
TABLET, FILM COATED ORAL
Qty: 6 TABLET | Refills: 0 | Status: SHIPPED | OUTPATIENT
Start: 2019-09-30 | End: 2020-02-07

## 2019-09-30 RX ORDER — ATORVASTATIN CALCIUM 10 MG/1
10 TABLET, FILM COATED ORAL NIGHTLY
Qty: 90 TABLET | Refills: 3 | Status: SHIPPED | OUTPATIENT
Start: 2019-09-30 | End: 2020-11-03

## 2019-09-30 NOTE — PROGRESS NOTES
HPI:   Nitin Plasencia is a 59year old female who presents for a complete physical exam.    Patient has multiple complaints. First she states that having some right foot pain on the top of her foot. She has known history of plantar fasciitis. No trauma. Current Outpatient Medications:  Montelukast Sodium 10 MG Oral Tab Take 1 tablet (10 mg total) by mouth nightly.  Disp: 90 tablet Rfl: 0   Varenicline Tartrate (CHANTIX STARTING MONTH JOCELIN) 0.5 MG X 11 & 1 MG X 42 Oral Misc Starter jocelin as directed Disp Disease Mother    • Lipids Mother    • Breast Cancer Mother 55   • Heart Disease Father    • Cancer Father         liver   • Hypertension Father    • Breast Cancer Maternal Aunt 54   • Breast Cancer Paternal Aunt 61   • Breast Cancer Paternal Cousin Male 10 well developed, well nourished and in no apparent distress  SKIN: no rashes,no suspicious lesions  HEENT: atraumatic, normocephalic,ears, nose and throat are normal  EYES: PERRLA, EOMI, sclera, conjunctiva are clear  NECK: supple,no adenopathy,no carotid b osteoporosis and bone density testing. Aerobic exercise 30 minutes five days a week for cardiovascular fitness and 45-60 minutes 6-7 days a week for weight loss. Advised testicular self exam once a month.     Above age 28-36, patient was told to have a

## 2019-10-14 ENCOUNTER — PATIENT MESSAGE (OUTPATIENT)
Dept: FAMILY MEDICINE CLINIC | Facility: CLINIC | Age: 64
End: 2019-10-14

## 2019-10-14 DIAGNOSIS — Z12.31 ENCOUNTER FOR SCREENING MAMMOGRAM FOR MALIGNANT NEOPLASM OF BREAST: Primary | ICD-10-CM

## 2019-10-15 NOTE — TELEPHONE ENCOUNTER
From: Dianne Hill  To: Theo Gonzalez MD  Sent: 10/14/2019 10:27 PM CDT  Subject: Referral Request    Good evening.  It is time for my 1 year routine mamogram. I have an extensive family history of breast carcinoma including both breasts in my mother, al

## 2019-11-20 ENCOUNTER — HOSPITAL ENCOUNTER (OUTPATIENT)
Dept: MAMMOGRAPHY | Age: 64
Discharge: HOME | End: 2019-11-20
Attending: FAMILY MEDICINE
Payer: COMMERCIAL

## 2019-11-20 DIAGNOSIS — Z12.31 ENCOUNTER FOR SCREENING MAMMOGRAM FOR MALIGNANT NEOPLASM OF BREAST: ICD-10-CM

## 2019-11-20 PROCEDURE — 77067 SCR MAMMO BI INCL CAD: CPT | Performed by: FAMILY MEDICINE

## 2019-11-20 PROCEDURE — 77063 BREAST TOMOSYNTHESIS BI: CPT | Performed by: FAMILY MEDICINE

## 2019-12-30 DIAGNOSIS — Z76.0 MEDICATION REFILL: ICD-10-CM

## 2020-01-03 RX ORDER — MONTELUKAST SODIUM 10 MG/1
10 TABLET ORAL NIGHTLY
Qty: 90 TABLET | Refills: 0 | Status: SHIPPED | OUTPATIENT
Start: 2020-01-03 | End: 2020-04-02

## 2020-01-03 NOTE — TELEPHONE ENCOUNTER
Requested Prescriptions     Pending Prescriptions Disp Refills   • Montelukast Sodium 10 MG Oral Tab 90 tablet 0     Sig: Take 1 tablet (10 mg total) by mouth nightly. LOV: 9/30/2019 for physical and acute visit.   RTC: yearly for physical.  Last Rele

## 2020-02-07 ENCOUNTER — OFFICE VISIT (OUTPATIENT)
Dept: FAMILY MEDICINE CLINIC | Facility: CLINIC | Age: 65
End: 2020-02-07
Payer: COMMERCIAL

## 2020-02-07 VITALS
HEART RATE: 70 BPM | RESPIRATION RATE: 18 BRPM | SYSTOLIC BLOOD PRESSURE: 108 MMHG | DIASTOLIC BLOOD PRESSURE: 80 MMHG | TEMPERATURE: 98 F | OXYGEN SATURATION: 98 % | HEIGHT: 63.75 IN | WEIGHT: 178.81 LBS | BODY MASS INDEX: 30.91 KG/M2

## 2020-02-07 DIAGNOSIS — Z00.00 ROUTINE ADULT HEALTH MAINTENANCE: ICD-10-CM

## 2020-02-07 DIAGNOSIS — R05.8 PRODUCTIVE COUGH: Primary | ICD-10-CM

## 2020-02-07 PROCEDURE — 99213 OFFICE O/P EST LOW 20 MIN: CPT | Performed by: FAMILY MEDICINE

## 2020-02-07 RX ORDER — CODEINE PHOSPHATE AND GUAIFENESIN 10; 100 MG/5ML; MG/5ML
5 SOLUTION ORAL 3 TIMES DAILY PRN
Qty: 118 ML | Refills: 0 | Status: SHIPPED | OUTPATIENT
Start: 2020-02-07 | End: 2020-09-14

## 2020-02-07 RX ORDER — SULFAMETHOXAZOLE AND TRIMETHOPRIM 800; 160 MG/1; MG/1
1 TABLET ORAL 2 TIMES DAILY
Qty: 20 TABLET | Refills: 0 | Status: SHIPPED | OUTPATIENT
Start: 2020-02-07 | End: 2020-02-17

## 2020-02-07 NOTE — PROGRESS NOTES
HPI:   Anneliese Glez is a 59year old female that presents for Patient presents with:  Cough: Presistent cough with yellow phlems and body ache. She stated she's flying wed next week. Chest Congestion: Has been using singulair.         Past medical, surgica Rfl: 6    REVIEW OF SYSTEMS:     Comprehensive ROS negative unless noted in HPI    PHYSICAL EXAM:   /80 (BP Location: Right arm, Patient Position: Sitting)   Pulse 70   Temp 98.2 °F (36.8 °C) (Oral)   Resp 18   Ht 63.75\"   Wt 178 lb 12.8 oz (81.1 kg

## 2020-03-12 ENCOUNTER — PATIENT MESSAGE (OUTPATIENT)
Dept: FAMILY MEDICINE CLINIC | Facility: CLINIC | Age: 65
End: 2020-03-12

## 2020-03-12 NOTE — TELEPHONE ENCOUNTER
From: Neel King  To: Charmayne Devoid, MD  Sent: 3/12/2020 9:42 AM CDT  Subject: Other    Good morning. Am planning on flying to [place] from [facility] next week.  Considering the recent (February )diagnosis of bronchitis which was treated and appears resol

## 2020-03-12 NOTE — TELEPHONE ENCOUNTER
See [employer] message:    Last OV 2/7/2020  ASSESSMENT AND PLAN:   1.  Productive cough  Bactrim DS, Cheratussin AC, vitamin C Tylenol OTC cold medicine as needed fluids and rest.  Will order complete physical labs to be done fasting in October 2020 and nguyen

## 2020-04-02 DIAGNOSIS — Z76.0 MEDICATION REFILL: ICD-10-CM

## 2020-04-02 RX ORDER — MONTELUKAST SODIUM 10 MG/1
10 TABLET ORAL NIGHTLY
Qty: 90 TABLET | Refills: 0 | Status: SHIPPED | OUTPATIENT
Start: 2020-04-02 | End: 2020-06-22

## 2020-04-02 NOTE — TELEPHONE ENCOUNTER
Montelukast Sodium 10 MG Oral Tab               Sig: Take 1 tablet (10 mg total) by mouth nightly.     Disp:  90 tablet    Refills:  0    Start: 4/2/2020    Class: Normal    Non-formulary For: Medication refill    Last ordered: 3 months ago by [facility]

## 2020-06-22 ENCOUNTER — TELEPHONE (OUTPATIENT)
Dept: FAMILY MEDICINE CLINIC | Facility: CLINIC | Age: 65
End: 2020-06-22

## 2020-06-22 DIAGNOSIS — Z76.0 MEDICATION REFILL: ICD-10-CM

## 2020-06-22 RX ORDER — MONTELUKAST SODIUM 10 MG/1
10 TABLET ORAL NIGHTLY
Qty: 90 TABLET | Refills: 1 | Status: SHIPPED | OUTPATIENT
Start: 2020-06-22 | End: 2020-07-14

## 2020-06-22 NOTE — TELEPHONE ENCOUNTER
Requesting: Singulair  LOV: 2/7/2020  RTC: -  Last Relevant Labs: ordered  Filled: 4/2/2020 #90 with 0 refills    No future appointments. Patient uses this medication for allergic rhinitis.    Medication pended for review/approval.

## 2020-06-22 NOTE — TELEPHONE ENCOUNTER
Pt will need a new prescription for the Montelukast sent to the [facility]. Patient states that she has had trouble with the pharmacy sending requests to the office.

## 2020-07-02 ENCOUNTER — VIRTUAL PHONE E/M (OUTPATIENT)
Dept: FAMILY MEDICINE CLINIC | Facility: CLINIC | Age: 65
End: 2020-07-02
Payer: MEDICARE

## 2020-07-02 ENCOUNTER — TELEPHONE (OUTPATIENT)
Dept: FAMILY MEDICINE CLINIC | Facility: CLINIC | Age: 65
End: 2020-07-02

## 2020-07-02 DIAGNOSIS — J01.91 ACUTE RECURRENT SINUSITIS, UNSPECIFIED LOCATION: Primary | ICD-10-CM

## 2020-07-02 DIAGNOSIS — R22.1 NECK SWELLING: ICD-10-CM

## 2020-07-02 PROCEDURE — 99441 PHONE E/M BY PHYS 5-10 MIN: CPT | Performed by: FAMILY MEDICINE

## 2020-07-02 RX ORDER — AMOXICILLIN AND CLAVULANATE POTASSIUM 500; 125 MG/1; MG/1
1 TABLET, FILM COATED ORAL 2 TIMES DAILY
Qty: 20 TABLET | Refills: 0 | Status: SHIPPED | OUTPATIENT
Start: 2020-07-02 | End: 2020-07-12

## 2020-07-02 RX ORDER — PREDNISONE 20 MG/1
20 TABLET ORAL 2 TIMES DAILY
Qty: 10 TABLET | Refills: 0 | Status: SHIPPED | OUTPATIENT
Start: 2020-07-02 | End: 2020-07-07

## 2020-07-02 NOTE — TELEPHONE ENCOUNTER
Future Appointments   Date Time Provider Mike Molina   7/2/2020  3:15 PM Haleigh Snyder MD [facility] [address] [facility] [address]     Patient verbally consents to a virtual/telephone check-in service for the date and time noted above.  Patient understands and accepts

## 2020-07-02 NOTE — PATIENT INSTRUCTIONS
Thank you for choosing Ekaterina Whitaker MD at [place] [address]  To Do: Livia James  1. Please take meds as directed. [facility] is located in [address]. Monday, Tuesday & Friday – 8 a.m. to 4 p.m. Wednesday, Thursday – 7 a.m. to 3 p.m. those potential risks and we strive to make you healthier and to improve your quality of life.     Referrals, and Radiology Information:    If your insurance requires a referral to a specialist, please allow 5 business days to process your referral request.

## 2020-07-02 NOTE — PROGRESS NOTES
HPI:    Patient ID: Judy Jones is a 59year old female. HPI  Ms. Penelope Linares is a pleasant 40-year-old female with history of allergies and recurrent sinusitis presenting for a phone visit today.   She has been having sinus congestion associated with mild c Bottle 6     Allergies:  Tetracycline               PHYSICAL EXAM:   Physical Exam   Constitutional: No distress. She is alert, coherent and comfortable over the phone and was able to speak in full sentences with no difficulty noted.               ELAINE

## 2020-07-09 NOTE — PROGRESS NOTES
Virtual Telephone Check-In    Noelle Alvarado verbally consents to a Virtual/Telephone Check-In visit on 07/09/20. Patient has been referred to the [facility] website at [email or web address] to review the yearly Consent to Treat document.     Patient Maye Quach

## 2020-07-13 DIAGNOSIS — Z76.0 MEDICATION REFILL: ICD-10-CM

## 2020-07-13 NOTE — TELEPHONE ENCOUNTER
Patient states the refill from 6/22/2020 didn't go through for [facility] [place], please submit again to [facility].

## 2020-07-13 NOTE — TELEPHONE ENCOUNTER
[facility] never received Rx for Montelukast sent on 6/22/2020    Rx pended, please advise

## 2020-07-14 RX ORDER — MONTELUKAST SODIUM 10 MG/1
10 TABLET ORAL NIGHTLY
Qty: 90 TABLET | Refills: 1 | Status: SHIPPED | OUTPATIENT
Start: 2020-07-14 | End: 2021-01-20

## 2020-09-14 ENCOUNTER — OFFICE VISIT (OUTPATIENT)
Dept: FAMILY MEDICINE CLINIC | Facility: CLINIC | Age: 65
End: 2020-09-14
Payer: MEDICARE

## 2020-09-14 VITALS
HEART RATE: 94 BPM | DIASTOLIC BLOOD PRESSURE: 82 MMHG | RESPIRATION RATE: 16 BRPM | OXYGEN SATURATION: 96 % | BODY MASS INDEX: 28.55 KG/M2 | WEIGHT: 177.63 LBS | TEMPERATURE: 98 F | SYSTOLIC BLOOD PRESSURE: 122 MMHG | HEIGHT: 66 IN

## 2020-09-14 DIAGNOSIS — J06.9 VIRAL UPPER RESPIRATORY TRACT INFECTION: ICD-10-CM

## 2020-09-14 DIAGNOSIS — Z00.00 ROUTINE ADULT HEALTH MAINTENANCE: Primary | ICD-10-CM

## 2020-09-14 DIAGNOSIS — M79.632 PAIN OF LEFT FOREARM: ICD-10-CM

## 2020-09-14 DIAGNOSIS — M79.671 RIGHT FOOT PAIN: ICD-10-CM

## 2020-09-14 DIAGNOSIS — M25.532 LEFT WRIST PAIN: ICD-10-CM

## 2020-09-14 DIAGNOSIS — Z12.31 SCREENING MAMMOGRAM FOR HIGH-RISK PATIENT: ICD-10-CM

## 2020-09-14 DIAGNOSIS — Z78.0 POSTMENOPAUSAL ESTROGEN DEFICIENCY: ICD-10-CM

## 2020-09-14 PROCEDURE — G0402 INITIAL PREVENTIVE EXAM: HCPCS | Performed by: FAMILY MEDICINE

## 2020-09-14 PROCEDURE — 90471 IMMUNIZATION ADMIN: CPT | Performed by: FAMILY MEDICINE

## 2020-09-14 PROCEDURE — 90662 IIV NO PRSV INCREASED AG IM: CPT | Performed by: FAMILY MEDICINE

## 2020-09-14 PROCEDURE — 3074F SYST BP LT 130 MM HG: CPT | Performed by: FAMILY MEDICINE

## 2020-09-14 PROCEDURE — 3008F BODY MASS INDEX DOCD: CPT | Performed by: FAMILY MEDICINE

## 2020-09-14 PROCEDURE — G0008 ADMIN INFLUENZA VIRUS VAC: HCPCS | Performed by: FAMILY MEDICINE

## 2020-09-14 PROCEDURE — 96160 PT-FOCUSED HLTH RISK ASSMT: CPT | Performed by: FAMILY MEDICINE

## 2020-09-14 PROCEDURE — 90750 HZV VACC RECOMBINANT IM: CPT | Performed by: FAMILY MEDICINE

## 2020-09-14 PROCEDURE — 3079F DIAST BP 80-89 MM HG: CPT | Performed by: FAMILY MEDICINE

## 2020-09-14 PROCEDURE — 99397 PER PM REEVAL EST PAT 65+ YR: CPT | Performed by: FAMILY MEDICINE

## 2020-09-14 NOTE — PROGRESS NOTES
HPI:   Elias Jean is a 72year old female who presents for a Medicare Initial Preventative Physical Exam (Welcome to Medicare- < 12 months on Medicare). Patient presents with: Well Child:  Welcome Medicare wellness exam - Last pap smear was completed here and there     This is a tobacco user, and I gave tobacco cessation counseling today. CAGE Alcohol screening   Dianne Hill was screened for Alcohol abuse and had a score of 0 so is at low risk.     Patient Care Team: Patient Care Team:  Danny Shepard (1992); other surgical history (Left, 2009); colposcopy, cervix w/upper adjacent vagina; w/endocervical curettage (10/2014); appendectomy; other (2007); leep (11/2014); and colonoscopy.     Her family history includes Breast Cancer (age of onset: 55) in her for AWV/FARHANAV)    Hearing Screening    Time taken: 9/14/2020  9:21 AM  Screening Method: Whisper Test  Whisper Test Result: Pass             Visual Acuity  Right Eye Visual Acuity: Corrected Right Eye Chart Acuity: 20/25   Left Eye Visual Acuity: Corrected L High Dose 65 YRS & Older PRSV Free (19312) 09/14/2020   • Fluvirin, 3 Years & >, Im 10/10/2009   • Tetanus 10/10/2010   • Zoster Vaccine Recombinant Adjuvanted (Shingrix) 09/14/2020        ASSESSMENT AND OTHER RELEVANT CHRONIC CONDITIONS:   Livia James i of endometrial biopsy in the past.    Patient Active Problem List:     HGSIL (high grade squamous intraepithelial lesion) on Pap smear of cervix treated     Diverticulosis stable     Dyslipidemia stable     Nasal polyps stable     Allergic rhinitis due to DEXA BONE DENSITOMETRY (CPT=77080) 10/29/2014    No flowsheet data found.     Pap and Pelvic      Pap: Every 3 yrs age 21-65 or Pap+HPV every 5 yrs age 33-67, age 72 and older at high risk Pap Smear,3 Years due on 09/18/2023 Update Health Maintenance if nimco

## 2020-09-25 LAB
ABSOLUTE BASOPHILS: 72 CELLS/UL (ref 0–200)
ABSOLUTE EOSINOPHILS: 252 CELLS/UL (ref 15–500)
ABSOLUTE LYMPHOCYTES: 2088 CELLS/UL (ref 850–3900)
ABSOLUTE MONOCYTES: 480 CELLS/UL (ref 200–950)
ABSOLUTE NEUTROPHILS: 3108 CELLS/UL (ref 1500–7800)
ALBUMIN/GLOBULIN RATIO: 2 (CALC) (ref 1–2.5)
ALBUMIN: 4.3 G/DL (ref 3.6–5.1)
ALKALINE PHOSPHATASE: 77 U/L (ref 37–153)
ALT: 18 U/L (ref 6–29)
AST: 16 U/L (ref 10–35)
BASOPHILS: 1.2 %
BILIRUBIN, TOTAL: 0.7 MG/DL (ref 0.2–1.2)
BUN: 17 MG/DL (ref 7–25)
CALCIUM: 9.7 MG/DL (ref 8.6–10.4)
CARBON DIOXIDE: 28 MMOL/L (ref 20–32)
CHLORIDE: 108 MMOL/L (ref 98–110)
CHOL/HDLC RATIO: 3.5 (CALC)
CHOLESTEROL, TOTAL: 195 MG/DL
CREATININE: 0.8 MG/DL (ref 0.5–0.99)
EGFR IF AFRICN AM: 90 ML/MIN/1.73M2
EGFR IF NONAFRICN AM: 77 ML/MIN/1.73M2
EOSINOPHILS: 4.2 %
GLOBULIN: 2.2 G/DL (CALC) (ref 1.9–3.7)
GLUCOSE: 78 MG/DL (ref 65–99)
HDL CHOLESTEROL: 55 MG/DL
HEMATOCRIT: 41.5 % (ref 35–45)
HEMOGLOBIN: 13.9 G/DL (ref 11.7–15.5)
LDL-CHOLESTEROL: 114 MG/DL (CALC)
LYMPHOCYTES: 34.8 %
MCH: 31.8 PG (ref 27–33)
MCHC: 33.5 G/DL (ref 32–36)
MCV: 95 FL (ref 80–100)
MONOCYTES: 8 %
MPV: 10.2 FL (ref 7.5–12.5)
NEUTROPHILS: 51.8 %
NON-HDL CHOLESTEROL: 140 MG/DL (CALC)
PLATELET COUNT: 229 THOUSAND/UL (ref 140–400)
POTASSIUM: 4.4 MMOL/L (ref 3.5–5.3)
PROTEIN, TOTAL: 6.5 G/DL (ref 6.1–8.1)
RDW: 12.4 % (ref 11–15)
RED BLOOD CELL COUNT: 4.37 MILLION/UL (ref 3.8–5.1)
SODIUM: 143 MMOL/L (ref 135–146)
T4, FREE: 0.9 NG/DL (ref 0.8–1.8)
TRIGLYCERIDES: 151 MG/DL
TSH: 3.14 MIU/L (ref 0.4–4.5)
WHITE BLOOD CELL COUNT: 6 THOUSAND/UL (ref 3.8–10.8)

## 2020-10-14 ENCOUNTER — TELEPHONE (OUTPATIENT)
Dept: FAMILY MEDICINE CLINIC | Facility: CLINIC | Age: 65
End: 2020-10-14

## 2020-10-14 DIAGNOSIS — M79.632 PAIN OF LEFT FOREARM: Primary | ICD-10-CM

## 2020-10-14 NOTE — TELEPHONE ENCOUNTER
Calling because MRI for patient's elbow was denied by the insurance company. Calling to see if physician will file an appeal for patient.  Will fax over information regarding denial.

## 2020-10-14 NOTE — TELEPHONE ENCOUNTER
Pt would like for office to file an appeal with her insurance since they denied her MRI. mri    It was reccommended pt get xray and physical therapy.  However, pt is saying it is a tendon issue and will not show up on the xray and physical therapy would lux

## 2020-10-15 NOTE — TELEPHONE ENCOUNTER
Lm informing pt that we cannot get her MRI approved without pt having an xray and/or PT first.  Since pt feels these are not in her best interest a referral to ortho has been placed, contact info provided.   Informed pt that if specialist feels an MRI is wa

## 2020-10-15 NOTE — TELEPHONE ENCOUNTER
See TE, pt requesting appeal for denied MRI of elbow, would you like to refer to ortho as pt is declining PT and xray?  Referral pended for approval/denial.

## 2020-10-19 ENCOUNTER — TELEPHONE (OUTPATIENT)
Dept: FAMILY MEDICINE CLINIC | Facility: CLINIC | Age: 65
End: 2020-10-19

## 2020-10-21 PROBLEM — S56.912A ELBOW STRAIN, LEFT, INITIAL ENCOUNTER: Status: ACTIVE | Noted: 2020-10-21

## 2020-10-21 PROBLEM — S46.912A ELBOW STRAIN, LEFT, INITIAL ENCOUNTER: Status: ACTIVE | Noted: 2020-10-21

## 2020-11-03 DIAGNOSIS — Z76.0 MEDICATION REFILL: ICD-10-CM

## 2020-11-03 DIAGNOSIS — E78.5 DYSLIPIDEMIA: ICD-10-CM

## 2020-11-03 RX ORDER — ATORVASTATIN CALCIUM 10 MG/1
10 TABLET, FILM COATED ORAL NIGHTLY
Qty: 90 TABLET | Refills: 1 | Status: SHIPPED | OUTPATIENT
Start: 2020-11-03 | End: 2021-01-19

## 2020-11-03 NOTE — TELEPHONE ENCOUNTER
Cholesterol Medication Protocol Btibip1611/03/2020 08:53 AM   ALT < 80 Protocol Details    ALT resulted within past year     Lipid panel within past 12 months     Appointment within past 12 or next 3 months      Refill protocol passed because the patient met

## 2020-11-24 ENCOUNTER — HOSPITAL ENCOUNTER (OUTPATIENT)
Dept: BONE DENSITY | Age: 65
Discharge: HOME | End: 2020-11-24
Attending: FAMILY MEDICINE
Payer: MEDICARE

## 2020-11-24 ENCOUNTER — HOSPITAL ENCOUNTER (OUTPATIENT)
Dept: MAMMOGRAPHY | Age: 65
Discharge: HOME | End: 2020-11-24
Attending: FAMILY MEDICINE
Payer: MEDICARE

## 2020-11-24 DIAGNOSIS — Z12.31 ENCOUNTER FOR SCREENING MAMMOGRAM FOR HIGH-RISK PATIENT: ICD-10-CM

## 2020-11-24 DIAGNOSIS — Z78.0 POSTMENOPAUSAL ESTROGEN DEFICIENCY: ICD-10-CM

## 2020-11-24 PROCEDURE — 77063 BREAST TOMOSYNTHESIS BI: CPT | Performed by: FAMILY MEDICINE

## 2020-11-24 PROCEDURE — 77080 DXA BONE DENSITY AXIAL: CPT | Performed by: FAMILY MEDICINE

## 2020-11-24 PROCEDURE — 77067 SCR MAMMO BI INCL CAD: CPT | Performed by: FAMILY MEDICINE

## 2021-01-15 DIAGNOSIS — Z76.0 MEDICATION REFILL: ICD-10-CM

## 2021-01-16 DIAGNOSIS — Z76.0 MEDICATION REFILL: ICD-10-CM

## 2021-01-16 DIAGNOSIS — E78.5 DYSLIPIDEMIA: ICD-10-CM

## 2021-01-18 NOTE — TELEPHONE ENCOUNTER
Montelukast Sodium 10 MG Oral Tab          Possible duplicate: Main to review recent actions on this medication    Sig: Take 1 tablet (10 mg total) by mouth nightly.     Disp:  90 tablet    Refills:  1    Start: 1/15/2021    Class: Normal    Non-formulary

## 2021-01-19 RX ORDER — ATORVASTATIN CALCIUM 10 MG/1
TABLET, FILM COATED ORAL
Qty: 90 TABLET | Refills: 2 | Status: SHIPPED | OUTPATIENT
Start: 2021-01-19 | End: 2021-11-15

## 2021-01-19 RX ORDER — MONTELUKAST SODIUM 10 MG/1
10 TABLET ORAL NIGHTLY
Qty: 90 TABLET | Refills: 1 | OUTPATIENT
Start: 2021-01-19

## 2021-01-20 DIAGNOSIS — Z76.0 MEDICATION REFILL: ICD-10-CM

## 2021-01-20 RX ORDER — MONTELUKAST SODIUM 10 MG/1
10 TABLET ORAL NIGHTLY
Qty: 90 TABLET | Refills: 1 | Status: SHIPPED | OUTPATIENT
Start: 2021-01-20 | End: 2021-08-06

## 2021-01-20 NOTE — TELEPHONE ENCOUNTER
Requesting Montelukast 10mg  LOV: 9/14/2020 Physical  RTC: not specified in notes  Last Relevant Labs: 9/24/2020  Filled: 7/14/2020 #90 with 1 refills    No future appointments.     Failed protocol:    Asthma & COPD Medication Protocol [id number]/20/2021 09:1

## 2021-03-12 DIAGNOSIS — Z23 NEED FOR VACCINATION: ICD-10-CM

## 2021-05-15 ENCOUNTER — PATIENT MESSAGE (OUTPATIENT)
Dept: FAMILY MEDICINE CLINIC | Facility: CLINIC | Age: 66
End: 2021-05-15

## 2021-05-15 ENCOUNTER — OFFICE VISIT (OUTPATIENT)
Dept: FAMILY MEDICINE CLINIC | Facility: CLINIC | Age: 66
End: 2021-05-15
Payer: MEDICARE

## 2021-05-15 DIAGNOSIS — J01.00 ACUTE NON-RECURRENT MAXILLARY SINUSITIS: Primary | ICD-10-CM

## 2021-05-15 PROCEDURE — 99213 OFFICE O/P EST LOW 20 MIN: CPT | Performed by: PHYSICIAN ASSISTANT

## 2021-05-15 RX ORDER — FLUCONAZOLE 150 MG/1
150 TABLET ORAL ONCE
Qty: 2 TABLET | Refills: 0 | Status: SHIPPED | OUTPATIENT
Start: 2021-05-15 | End: 2021-05-15

## 2021-05-15 RX ORDER — AMOXICILLIN AND CLAVULANATE POTASSIUM 875; 125 MG/1; MG/1
1 TABLET, FILM COATED ORAL 2 TIMES DAILY
Qty: 20 TABLET | Refills: 0 | Status: SHIPPED | OUTPATIENT
Start: 2021-05-15 | End: 2021-05-25

## 2021-05-15 RX ORDER — METHYLPREDNISOLONE 4 MG/1
TABLET ORAL
Qty: 1 KIT | Refills: 0 | Status: SHIPPED | OUTPATIENT
Start: 2021-05-15 | End: 2021-06-28 | Stop reason: ALTCHOICE

## 2021-05-16 NOTE — TELEPHONE ENCOUNTER
From: Alan Holt  To: Leilani Antony PA-C  Sent: 5/15/2021 8:03 AM CDT  Subject: Other    I can\"t get on for my virtual visit

## 2021-06-28 ENCOUNTER — OFFICE VISIT (OUTPATIENT)
Dept: FAMILY MEDICINE CLINIC | Facility: CLINIC | Age: 66
End: 2021-06-28
Payer: MEDICARE

## 2021-06-28 VITALS
HEART RATE: 76 BPM | SYSTOLIC BLOOD PRESSURE: 120 MMHG | OXYGEN SATURATION: 98 % | HEIGHT: 66 IN | RESPIRATION RATE: 16 BRPM | DIASTOLIC BLOOD PRESSURE: 70 MMHG | TEMPERATURE: 97 F | BODY MASS INDEX: 28.85 KG/M2 | WEIGHT: 179.5 LBS

## 2021-06-28 DIAGNOSIS — J01.00 ACUTE NON-RECURRENT MAXILLARY SINUSITIS: Primary | ICD-10-CM

## 2021-06-28 PROCEDURE — 3078F DIAST BP <80 MM HG: CPT | Performed by: FAMILY MEDICINE

## 2021-06-28 PROCEDURE — 3074F SYST BP LT 130 MM HG: CPT | Performed by: FAMILY MEDICINE

## 2021-06-28 PROCEDURE — 3008F BODY MASS INDEX DOCD: CPT | Performed by: FAMILY MEDICINE

## 2021-06-28 PROCEDURE — 99213 OFFICE O/P EST LOW 20 MIN: CPT | Performed by: FAMILY MEDICINE

## 2021-06-28 RX ORDER — SULFAMETHOXAZOLE AND TRIMETHOPRIM 800; 160 MG/1; MG/1
1 TABLET ORAL 2 TIMES DAILY
Qty: 20 TABLET | Refills: 0 | Status: SHIPPED | OUTPATIENT
Start: 2021-06-28 | End: 2021-07-08

## 2021-06-29 NOTE — PROGRESS NOTES
HPI:   Suella Bumpers is a 72year old female that presents for Patient presents with:  Sinus Problem: states its not resolved - has been using mucinex - unable to cough it up - states when she is walking a mile she starts to sweat    Patient is here with s Multiple Vitamins-Minerals (OCUVITE ADULT FORMULA OR), Take by mouth., Disp: , Rfl:   •  Fluticasone Propionate 50 MCG/ACT Nasal Suspension, 2 sprays by Nasal route daily. , Disp: 1 Bottle, Rfl: 6    REVIEW OF SYSTEMS:     Comprehensive ROS negative except grossly intact, no tremor, no abnormal movements, 2+ deep tendon reflexes bilateral patellar and achilles, bilateral upper extremities    ASSESSMENT AND PLAN:      1. Acute non-recurrent maxillary sinusitis    I suspect patient may have acute sinusitis.   Olesya Warner

## 2021-08-05 DIAGNOSIS — Z76.0 MEDICATION REFILL: ICD-10-CM

## 2021-08-06 NOTE — TELEPHONE ENCOUNTER
Requested Prescriptions     Name from pharmacy: MONTELUKAST  TAB 10MG         Will file in chart as: MONTELUKAST SODIUM 10 MG Oral Tab    Sig: TAKE 1 TABLET NIGHTLY    Disp:  90 tablet    Refills:  1    Start: 8/5/2021    Class: Normal    Non-formulary For

## 2021-08-07 RX ORDER — MONTELUKAST SODIUM 10 MG/1
TABLET ORAL
Qty: 90 TABLET | Refills: 0 | Status: SHIPPED | OUTPATIENT
Start: 2021-08-07 | End: 2021-11-15

## 2021-08-27 ENCOUNTER — OFFICE VISIT (OUTPATIENT)
Dept: FAMILY MEDICINE CLINIC | Facility: CLINIC | Age: 66
End: 2021-08-27
Payer: MEDICARE

## 2021-08-27 VITALS
WEIGHT: 178.25 LBS | HEART RATE: 80 BPM | TEMPERATURE: 97 F | DIASTOLIC BLOOD PRESSURE: 62 MMHG | HEIGHT: 66 IN | SYSTOLIC BLOOD PRESSURE: 118 MMHG | OXYGEN SATURATION: 98 % | RESPIRATION RATE: 16 BRPM | BODY MASS INDEX: 28.65 KG/M2

## 2021-08-27 DIAGNOSIS — Z23 NEED FOR VACCINATION: ICD-10-CM

## 2021-08-27 DIAGNOSIS — Z00.00 ROUTINE ADULT HEALTH MAINTENANCE: Primary | ICD-10-CM

## 2021-08-27 PROCEDURE — 99397 PER PM REEVAL EST PAT 65+ YR: CPT | Performed by: FAMILY MEDICINE

## 2021-08-27 PROCEDURE — G0438 PPPS, INITIAL VISIT: HCPCS | Performed by: FAMILY MEDICINE

## 2021-08-27 PROCEDURE — 96160 PT-FOCUSED HLTH RISK ASSMT: CPT | Performed by: FAMILY MEDICINE

## 2021-08-27 PROCEDURE — 3078F DIAST BP <80 MM HG: CPT | Performed by: FAMILY MEDICINE

## 2021-08-27 PROCEDURE — 3008F BODY MASS INDEX DOCD: CPT | Performed by: FAMILY MEDICINE

## 2021-08-27 PROCEDURE — 3074F SYST BP LT 130 MM HG: CPT | Performed by: FAMILY MEDICINE

## 2021-08-27 NOTE — PROGRESS NOTES
HPI:   Maynor Hernandez is a 77year old female who presents for a MA (Medicare Advantage) [facility] (Once per calendar year). Doing well no complaints.         Fall/Risk Assessment   She has been screened for Falls and is low risk: Fall/Risk Scorin (high grade squamous intraepithelial lesion) on Pap smear of cervix     Diverticulosis     Dyslipidemia     Nasal polyps     Allergic rhinitis due to dust mite     Tobacco use     Allergic reaction to insect bite     Atrophic vaginitis     Pain of left for vagina; w/endocervical curettage (10/2014); appendectomy; other (2007); leep (11/2014); and colonoscopy.     Her family history includes Breast Cancer (age of onset: 55) in her mother; Breast Cancer (age of onset: 54) in her maternal aunt; Breast Cancer (ag Visual Acuity                           General Appearance:  Alert, cooperative, no distress, appears stated age   Head:  Normocephalic, without obvious abnormality, atraumatic   Eyes:  PERRL, conjunctiva/corneas clear, EOM's intact both eyes   Ears: IMMUNIZATION ADMINISTRATION  -     PNEUMOCOCCAL IMM (PNEUMOVAX)  -     COMP METABOLIC PANEL (14)  -     CBC WITH DIFFERENTIAL WITH PLATELET  -     LIPID PANEL  -     ASSAY, THYROID STIM HORMONE  -     FREE T4 (FREE THYROXINE)  -     [facility] LOS 2D+3D SCREENIN coverage.    PREVENTATIVE SERVICES FREQUENCY &  COVERAGE DETAILS LAST COMPLETION DATE   Diabetes Screening    Fasting Blood Sugar /  Glucose    One screening every 12 months if never tested or if previously tested but not diagnosed with pre-diabetes   One s recommendations at this time   Screening Mammogram    Mammogram     Recommend annually for all female patients aged 36 and older    One baseline mammogram covered for patients aged 35-39 11/24/2020    Mammogram due on 11/24/2021    Immunizations    Nika Arreola

## 2021-11-15 DIAGNOSIS — E78.5 DYSLIPIDEMIA: ICD-10-CM

## 2021-11-15 DIAGNOSIS — Z76.0 MEDICATION REFILL: ICD-10-CM

## 2021-11-15 RX ORDER — ATORVASTATIN CALCIUM 10 MG/1
TABLET, FILM COATED ORAL
Qty: 90 TABLET | Refills: 1 | Status: SHIPPED | OUTPATIENT
Start: 2021-11-15

## 2021-11-15 RX ORDER — MONTELUKAST SODIUM 10 MG/1
10 TABLET ORAL NIGHTLY
Qty: 90 TABLET | Refills: 0 | Status: SHIPPED | OUTPATIENT
Start: 2021-11-15

## 2021-11-15 RX ORDER — ATORVASTATIN CALCIUM 10 MG/1
10 TABLET, FILM COATED ORAL NIGHTLY
Qty: 90 TABLET | Refills: 2 | Status: CANCELLED | OUTPATIENT
Start: 2021-11-15

## 2021-11-15 RX ORDER — MONTELUKAST SODIUM 10 MG/1
TABLET ORAL
Qty: 90 TABLET | Refills: 0 | OUTPATIENT
Start: 2021-11-15

## 2021-11-15 NOTE — TELEPHONE ENCOUNTER
Requested Prescriptions     Name from pharmacy: ATORVASTATIN TAB 10MG         Will file in chart as: ATORVASTATIN 10 MG Oral Tab     Possible duplicate: Main to review recent actions on this medication    Sig: TAKE 1 TABLET NIGHTLY    Disp:  90 tablet

## 2021-11-15 NOTE — TELEPHONE ENCOUNTER
Name from pharmacy: MONTELUKAST  TAB 10MG          Will file in chart as: MONTELUKAST 10 MG Oral Tab     Possible duplicate: Main to review recent actions on this medication    Sig: TAKE 1 TABLET NIGHTLY    Disp:  90 tablet    Refills:  0    Start: 11/15/

## 2021-11-15 NOTE — TELEPHONE ENCOUNTER
Requested Prescriptions     atorvastatin 10 MG Oral Tab         The original prescription was reordered on 11/15/2021 by Lara Dyer MA. Renewing this prescription may not be appropriate.      Possible duplicate: Main to review recent actions on this m

## 2021-11-29 ENCOUNTER — HOSPITAL ENCOUNTER (OUTPATIENT)
Dept: MAMMOGRAPHY | Age: 66
Discharge: HOME | End: 2021-11-29
Attending: FAMILY MEDICINE
Payer: MEDICARE

## 2021-11-29 DIAGNOSIS — Z00.00 ROUTINE ADULT HEALTH MAINTENANCE: ICD-10-CM

## 2021-11-29 PROCEDURE — 77063 BREAST TOMOSYNTHESIS BI: CPT | Performed by: FAMILY MEDICINE

## 2021-11-29 PROCEDURE — 77067 SCR MAMMO BI INCL CAD: CPT | Performed by: FAMILY MEDICINE

## 2021-12-20 ENCOUNTER — OFFICE VISIT (OUTPATIENT)
Dept: FAMILY MEDICINE CLINIC | Facility: CLINIC | Age: 66
End: 2021-12-20
Payer: MEDICARE

## 2021-12-20 VITALS
SYSTOLIC BLOOD PRESSURE: 120 MMHG | OXYGEN SATURATION: 98 % | TEMPERATURE: 97 F | RESPIRATION RATE: 18 BRPM | HEART RATE: 75 BPM | DIASTOLIC BLOOD PRESSURE: 70 MMHG

## 2021-12-20 DIAGNOSIS — J30.9 ALLERGIC RHINITIS, UNSPECIFIED SEASONALITY, UNSPECIFIED TRIGGER: ICD-10-CM

## 2021-12-20 DIAGNOSIS — J01.00 ACUTE NON-RECURRENT MAXILLARY SINUSITIS: Primary | ICD-10-CM

## 2021-12-20 PROCEDURE — 3078F DIAST BP <80 MM HG: CPT | Performed by: FAMILY MEDICINE

## 2021-12-20 PROCEDURE — 3074F SYST BP LT 130 MM HG: CPT | Performed by: FAMILY MEDICINE

## 2021-12-20 PROCEDURE — 99214 OFFICE O/P EST MOD 30 MIN: CPT | Performed by: FAMILY MEDICINE

## 2021-12-20 RX ORDER — METHYLPREDNISOLONE 4 MG/1
TABLET ORAL
Qty: 21 TABLET | Refills: 0 | Status: SHIPPED | OUTPATIENT
Start: 2021-12-20

## 2021-12-20 RX ORDER — AZITHROMYCIN 250 MG/1
TABLET, FILM COATED ORAL
Qty: 6 TABLET | Refills: 0 | Status: SHIPPED | OUTPATIENT
Start: 2021-12-20 | End: 2021-12-25

## 2021-12-20 NOTE — PROGRESS NOTES
HPI:   James Matthews is a 77year old female that presents for Patient presents with:  Sinus Problem: ears blocked, feels very cold, getting worse - taking Mucinex daily - has had all COVID vaccine but no flu vaccine    Patient has long history of sinus al tablet (10 mg total) by mouth nightly., Disp: 90 tablet, Rfl: 0  •  ATORVASTATIN 10 MG Oral Tab, TAKE 1 TABLET NIGHTLY, Disp: 90 tablet, Rfl: 1  •  guaiFENesin (MUCINEX OR), Take by mouth., Disp: , Rfl:   •  ZINC OR, Use as directed in the mouth or throat. sounds positive in all 4 quadrants, no masses, no hepatosplenomegaly.   MUSCULOSKELETAL:  in both upper and lower extremities: able to move spontaneously, 5/5 strength, sensation intact to light touch and pin prick, gait is normal  NEURO:  cranial nerves II

## 2022-02-15 RX ORDER — MONTELUKAST SODIUM 10 MG/1
10 TABLET ORAL NIGHTLY
Qty: 90 TABLET | Refills: 0 | Status: SHIPPED | OUTPATIENT
Start: 2022-02-15

## 2022-02-25 NOTE — PROGRESS NOTES
Patient: Kenya Apodaca    YOB: 1955    Date: 02/28/2022    Primary Care Provider: Ariella Harrell APRN    Chief Complaint   Patient presents with   • Colonoscopy     positve fit test       SUBJECTIVE:    History of present illness: Patient with a several month history of diarrhea.  Daily.  Requiring Imodium daily to control the diarrhea.  No black or bloody bowel movements.  No abdominal pain.  Doing well otherwise.  Also had a positive Hemoccult.    The following portions of the patient's history were reviewed and updated as appropriate: allergies, current medications, past family history, past medical history, past social history, past surgical history and problem list.    Review of Systems   Constitutional: Negative for chills, diaphoresis, fatigue and fever.   HENT: Negative for dental problem, drooling, ear discharge and hearing loss.    Respiratory: Negative for cough, choking, chest tightness and shortness of breath.    Cardiovascular: Negative for chest pain, palpitations and leg swelling.   Gastrointestinal: Positive for blood in stool and diarrhea.   Endocrine: Negative for cold intolerance and heat intolerance.   Genitourinary: Negative for flank pain, frequency and hematuria.   Neurological: Negative for seizures, light-headedness, numbness and headaches.   Psychiatric/Behavioral: Negative for agitation, behavioral problems and confusion.       History:  Past Medical History:   Diagnosis Date   • Diabetes mellitus (HCC)    • Hypertension        Past Surgical History:   Procedure Laterality Date   • FOOT SURGERY  2014   • NECK SURGERY  2014   • TUBAL ABDOMINAL LIGATION  1984    reversal       Family History   Problem Relation Age of Onset   • Hypertension Mother    • Heart disease Father    • Hypertension Sister    • Cancer Brother    • Diabetes Brother        Social History     Tobacco Use   • Smoking status: Current Every Day Smoker     Packs/day: 1.00   • Smokeless tobacco: Never  "Used   Substance Use Topics   • Alcohol use: Yes   • Drug use: Not on file       Allergies:  No Known Allergies    Medications:    Current Outpatient Medications:   •   MG tablet, , Disp: , Rfl:   •  losartan-hydrochlorothiazide (HYZAAR) 100-12.5 MG per tablet, , Disp: , Rfl:   •  metFORMIN (GLUCOPHAGE) 500 MG tablet, , Disp: , Rfl:   •  NP Thyroid 60 MG tablet, Take 60 mg by mouth Daily., Disp: , Rfl:   •  pantoprazole (PROTONIX) 40 MG EC tablet, , Disp: , Rfl:   •  Progesterone (PROMETRIUM) 200 MG capsule, Take 200 mg by mouth Daily., Disp: , Rfl:   •  rOPINIRole (REQUIP) 1 MG tablet, , Disp: , Rfl:   •  rosuvastatin (CRESTOR) 20 MG tablet, , Disp: , Rfl:   •  solifenacin (VESICARE) 10 MG tablet, , Disp: , Rfl:   •  True Metrix Blood Glucose Test test strip, , Disp: , Rfl:     OBJECTIVE:    Vital Signs:   Vitals:    02/28/22 1321   BP: 129/67   Pulse: 69   Resp: 18   Temp: 97.1 °F (36.2 °C)   TempSrc: Temporal   SpO2: 98%   Weight: 79.7 kg (175 lb 9.6 oz)   Height: 157.5 cm (62\")       Physical Exam:   General Appearance:    Alert, cooperative, in no acute distress   Head:    Normocephalic, without obvious abnormality, atraumatic   Eyes:            Normal.  No scleral icterus.  PERRLA    Lungs:     Clear to auscultation,respirations regular, even and                  unlabored    Heart:    Regular rhythm and normal rate, normal S1 and S2, no            murmur   Abdomen:     Normal bowel sounds, no masses, no organomegaly, soft        non-tender, non-distended, no guarding,    Extremities:   Moves all extremities well, no edema, no cyanosis, no             redness   Skin:   No bleeding, bruising or rash   Neurologic:   Normal without gross deficits.   Psychiatric: No evidence of depression or anxiety        Results Review:   I reviewed the patient's new clinical results.    Review of Systems was reviewed and confirmed as accurate as documented by the MA.    ASSESSMENT/PLAN:    1. Chronic diarrhea    2. Heme " positive stool      Mandate colonoscopy for further evaluation since it has also been 10 years since her last colonoscopy.  I explained the procedure to the patient as well as the risks of bleeding and perforation and they understand the ramifications of these potential complications and they wish to proceed.    Electronically signed by Mitch Carbajal MD  02/28/22 17:01 EST

## 2022-02-28 ENCOUNTER — OFFICE VISIT (OUTPATIENT)
Dept: SURGERY | Facility: CLINIC | Age: 67
End: 2022-02-28

## 2022-02-28 VITALS
HEIGHT: 62 IN | SYSTOLIC BLOOD PRESSURE: 129 MMHG | WEIGHT: 175.6 LBS | TEMPERATURE: 97.1 F | OXYGEN SATURATION: 98 % | DIASTOLIC BLOOD PRESSURE: 67 MMHG | BODY MASS INDEX: 32.31 KG/M2 | HEART RATE: 69 BPM | RESPIRATION RATE: 18 BRPM

## 2022-02-28 DIAGNOSIS — K52.9 CHRONIC DIARRHEA: Primary | ICD-10-CM

## 2022-02-28 DIAGNOSIS — R19.5 HEME POSITIVE STOOL: ICD-10-CM

## 2022-02-28 PROCEDURE — 99204 OFFICE O/P NEW MOD 45 MIN: CPT | Performed by: SURGERY

## 2022-02-28 RX ORDER — ROPINIROLE 1 MG/1
TABLET, FILM COATED ORAL
COMMUNITY
Start: 2021-11-30 | End: 2022-04-11 | Stop reason: DRUGHIGH

## 2022-02-28 RX ORDER — PANTOPRAZOLE SODIUM 40 MG/1
TABLET, DELAYED RELEASE ORAL
COMMUNITY
Start: 2022-02-24

## 2022-02-28 RX ORDER — IBUPROFEN 800 MG/1
TABLET ORAL
COMMUNITY
Start: 2022-02-24

## 2022-02-28 RX ORDER — SOLIFENACIN SUCCINATE 10 MG/1
TABLET, FILM COATED ORAL
COMMUNITY
Start: 2022-02-24

## 2022-02-28 RX ORDER — CALCIUM CITRATE/VITAMIN D3 200MG-6.25
TABLET ORAL
COMMUNITY
Start: 2021-11-30

## 2022-02-28 RX ORDER — LEVOTHYROXINE, LIOTHYRONINE 38; 9 UG/1; UG/1
60 TABLET ORAL DAILY
COMMUNITY
Start: 2022-01-27

## 2022-02-28 RX ORDER — LOSARTAN POTASSIUM AND HYDROCHLOROTHIAZIDE 12.5; 1 MG/1; MG/1
TABLET ORAL
COMMUNITY
Start: 2022-02-24

## 2022-02-28 RX ORDER — CHOLESTYRAMINE 4 G/9G
1 POWDER, FOR SUSPENSION ORAL 2 TIMES DAILY WITH MEALS
Qty: 30 PACKET | Refills: 1 | Status: SHIPPED | OUTPATIENT
Start: 2022-02-28 | End: 2022-04-11 | Stop reason: SDUPTHER

## 2022-02-28 RX ORDER — ROSUVASTATIN CALCIUM 20 MG/1
TABLET, COATED ORAL
COMMUNITY
Start: 2022-02-24

## 2022-02-28 RX ORDER — PROGESTERONE 200 MG/1
200 CAPSULE ORAL DAILY
COMMUNITY
Start: 2022-01-27

## 2022-03-09 ENCOUNTER — TELEPHONE (OUTPATIENT)
Dept: SURGERY | Facility: CLINIC | Age: 67
End: 2022-03-09

## 2022-03-09 NOTE — TELEPHONE ENCOUNTER
Left Message - L/M FOR ABDULLAHI TO CALL THE OFFICE TO CONFIRM THE COLONOSCOPY ON THE 14 @ [facility]. NEED TO CHECK THE MOBILE PHONE #.

## 2022-03-14 ENCOUNTER — OUTSIDE FACILITY SERVICE (OUTPATIENT)
Dept: SURGERY | Facility: CLINIC | Age: 67
End: 2022-03-14

## 2022-03-14 PROCEDURE — 45385 COLONOSCOPY W/LESION REMOVAL: CPT | Performed by: SURGERY

## 2022-03-14 PROCEDURE — 45380 COLONOSCOPY AND BIOPSY: CPT | Performed by: SURGERY

## 2022-03-28 ENCOUNTER — OFFICE VISIT (OUTPATIENT)
Dept: SURGERY | Facility: CLINIC | Age: 67
End: 2022-03-28

## 2022-03-28 VITALS
SYSTOLIC BLOOD PRESSURE: 114 MMHG | RESPIRATION RATE: 18 BRPM | DIASTOLIC BLOOD PRESSURE: 68 MMHG | OXYGEN SATURATION: 96 % | HEART RATE: 64 BPM | WEIGHT: 173.6 LBS | BODY MASS INDEX: 31.94 KG/M2 | HEIGHT: 62 IN

## 2022-03-28 DIAGNOSIS — K52.832 LYMPHOCYTIC COLITIS: Primary | ICD-10-CM

## 2022-03-28 PROCEDURE — 99214 OFFICE O/P EST MOD 30 MIN: CPT | Performed by: SURGERY

## 2022-03-28 RX ORDER — BUDESONIDE 9 MG/1
9 TABLET, FILM COATED, EXTENDED RELEASE ORAL DAILY
Qty: 30 TABLET | Refills: 1 | Status: SHIPPED | OUTPATIENT
Start: 2022-03-28 | End: 2022-03-28 | Stop reason: ALTCHOICE

## 2022-03-28 RX ORDER — BUDESONIDE 3 MG/1
9 CAPSULE, COATED PELLETS ORAL EVERY MORNING
Qty: 90 CAPSULE | Refills: 1 | Status: SHIPPED | OUTPATIENT
Start: 2022-03-28 | End: 2022-04-27

## 2022-03-28 NOTE — PROGRESS NOTES
Patient: Kenya Apodaca    YOB: 1955    Date: 03/28/2022    Primary Care Provider: Ariella Harrell APRN    Reason for Consultation: Follow-up colonoscopy    Chief Complaint   Patient presents with   • Post-op Follow-up     Colonoscopy with polypectomy       History of present illness:  I saw the patient in the office today as a followup from their recent colonoscopy.  She continues to have diarrhea but it is improved somewhat on the cholestyramine.  She was having more than 3 watery bowel movements a day.    The following portions of the patient's history were reviewed and updated as appropriate: allergies, current medications, past family history, past medical history, past social history, past surgical history and problem list.    Review of Systems   Constitutional: Negative for chills, diaphoresis, fatigue, fever and unexpected weight change.   HENT: Negative for dental problem, drooling, ear discharge, hearing loss, trouble swallowing and voice change.    Eyes: Negative for visual disturbance.   Respiratory: Negative for apnea, cough, choking, chest tightness, shortness of breath and wheezing.    Cardiovascular: Negative for chest pain, palpitations and leg swelling.   Gastrointestinal: Positive for diarrhea. Negative for abdominal distention, abdominal pain, anal bleeding, blood in stool, constipation, nausea, rectal pain and vomiting.   Endocrine: Negative for cold intolerance and heat intolerance.   Genitourinary: Negative for difficulty urinating, dysuria, flank pain, frequency and hematuria.   Musculoskeletal: Negative for back pain and gait problem.   Skin: Negative for color change, rash and wound.   Neurological: Negative for dizziness, seizures, syncope, speech difficulty, weakness, light-headedness, numbness and headaches.   Hematological: Negative for adenopathy. Does not bruise/bleed easily.   Psychiatric/Behavioral: Negative for agitation, behavioral problems and confusion. The  "patient is not nervous/anxious.      History:  Past Medical History:   Diagnosis Date   • Diabetes mellitus (HCC)    • Hypertension        Past Surgical History:   Procedure Laterality Date   • COLONOSCOPY  03/14/2022    with polypectomy   • FOOT SURGERY  2014   • NECK SURGERY  2014   • TUBAL ABDOMINAL LIGATION  1984    reversal       Family History   Problem Relation Age of Onset   • Hypertension Mother    • Heart disease Father    • Hypertension Sister    • Cancer Brother    • Diabetes Brother        Social History     Tobacco Use   • Smoking status: Current Every Day Smoker     Packs/day: 1.00   • Smokeless tobacco: Never Used   Substance Use Topics   • Alcohol use: Yes        Allergies:  No Known Allergies    Medications:    Current Outpatient Medications:   •  cholestyramine (Questran) 4 g packet, Take 1 packet by mouth 2 (Two) Times a Day With Meals., Disp: 30 packet, Rfl: 1  •   MG tablet, , Disp: , Rfl:   •  losartan-hydrochlorothiazide (HYZAAR) 100-12.5 MG per tablet, , Disp: , Rfl:   •  metFORMIN (GLUCOPHAGE) 500 MG tablet, , Disp: , Rfl:   •  NP Thyroid 60 MG tablet, Take 60 mg by mouth Daily., Disp: , Rfl:   •  pantoprazole (PROTONIX) 40 MG EC tablet, , Disp: , Rfl:   •  Progesterone (PROMETRIUM) 200 MG capsule, Take 200 mg by mouth Daily., Disp: , Rfl:   •  rOPINIRole (REQUIP) 1 MG tablet, , Disp: , Rfl:   •  rosuvastatin (CRESTOR) 20 MG tablet, , Disp: , Rfl:   •  solifenacin (VESICARE) 10 MG tablet, , Disp: , Rfl:   •  True Metrix Blood Glucose Test test strip, , Disp: , Rfl:   •  Budesonide ER 9 MG tablet sustained-release 24 hour, Take 9 mg by mouth Daily., Disp: 30 tablet, Rfl: 1    Vital Signs:  Vitals:    03/28/22 1418   BP: 114/68   Pulse: 64   Resp: 18   SpO2: 96%   Weight: 78.7 kg (173 lb 9.6 oz)   Height: 157.5 cm (62\")       Physical Exam:   General Appearance:    Alert, cooperative, in no acute distress   Abdomen:    Soft nontender nondistended   Chest:     clear to auscultation      "       Cor:     Regular rate and rhythm    Results Review:   I reviewed the patient's new clinical results.    Assessment / Plan:    1. Lymphocytic colitis        Patient with lymphocytic colitis on pathology from the random colon biopsies.  She has had some help with the cholestyramine but still has significant breakthrough symptoms.  She would like to try budesonide.  We will prescribe 9 mg daily for 6 to 8 weeks and taper after that.  I will also see her in 2 weeks to see how she is responding to the medication.  I have answered all of her questions.    Electronically signed by Mitch Carbajal MD  03/28/22

## 2022-04-08 NOTE — PROGRESS NOTES
"Patient: Kenya Apodaca  YOB: 1955    Date: 04/11/2022    Primary Care Provider: Ariella Harrell APRN    Chief Complaint   Patient presents with   • Follow-up     Colitis        History: Patient states that she was unable to get budesonide due to insurance reasons.  She is taking the cholestyramine which has helped significantly.    The following portions of the patient's history were reviewed and updated as appropriate: allergies, current medications, past family history, past medical history, past social history, past surgical history and problem list.    Review of Systems    Vital Signs  Vitals:    04/11/22 1436   BP: 118/65   Pulse: 62   Resp: 18   Temp: 98.2 °F (36.8 °C)   TempSrc: Temporal   SpO2: 97%   Weight: 76.5 kg (168 lb 9.6 oz)   Height: 157.5 cm (62\")       Allergies:  No Known Allergies    Medications:    Current Outpatient Medications:   •  cholestyramine (Questran) 4 g packet, Take 1 packet by mouth 2 (Two) Times a Day With Meals., Disp: 30 packet, Rfl: 1  •   MG tablet, , Disp: , Rfl:   •  losartan-hydrochlorothiazide (HYZAAR) 100-12.5 MG per tablet, , Disp: , Rfl:   •  metFORMIN (GLUCOPHAGE) 500 MG tablet, , Disp: , Rfl:   •  NP Thyroid 60 MG tablet, Take 60 mg by mouth Daily., Disp: , Rfl:   •  pantoprazole (PROTONIX) 40 MG EC tablet, , Disp: , Rfl:   •  Progesterone (PROMETRIUM) 200 MG capsule, Take 200 mg by mouth Daily., Disp: , Rfl:   •  rOPINIRole (REQUIP) 2 MG tablet, , Disp: , Rfl:   •  rosuvastatin (CRESTOR) 20 MG tablet, , Disp: , Rfl:   •  solifenacin (VESICARE) 10 MG tablet, , Disp: , Rfl:   •  True Metrix Blood Glucose Test test strip, , Disp: , Rfl:   •  Budesonide (Entocort EC) 3 MG 24 hr capsule, Take 3 capsules by mouth Every Morning for 30 days., Disp: 90 capsule, Rfl: 1    Physical Exam:   General Appearance:    Alert, cooperative, in no acute distress    Heart:   Rate and rhythm   Abdomen:    Soft and nontender   Results Review:       Review of " Systems was reviewed and confirmed as accurate as documented by the MA.    ASSESSMENT/PLAN:    1. Lymphocytic colitis       At this time her diarrhea is under control with the cholestyramine.  Unfortunately she was unable to get the budesonide.  If required we can also add Lomotil but for now we will continue the cholestyramine.  Avoid triggering agents including NSAIDs and even her PPI.  Recommend smoking cessation.  Otherwise I will have her follow-up as needed.  I will also make sure she gets her thyroid checked since there is absorption issues on the cholestyramine.    Electronically signed by Mitch Carbajal MD  04/11/22

## 2022-04-11 ENCOUNTER — OFFICE VISIT (OUTPATIENT)
Dept: SURGERY | Facility: CLINIC | Age: 67
End: 2022-04-11

## 2022-04-11 VITALS
DIASTOLIC BLOOD PRESSURE: 65 MMHG | BODY MASS INDEX: 31.03 KG/M2 | OXYGEN SATURATION: 97 % | RESPIRATION RATE: 18 BRPM | SYSTOLIC BLOOD PRESSURE: 118 MMHG | WEIGHT: 168.6 LBS | HEIGHT: 62 IN | HEART RATE: 62 BPM | TEMPERATURE: 98.2 F

## 2022-04-11 DIAGNOSIS — E03.9 HYPOTHYROIDISM, UNSPECIFIED TYPE: ICD-10-CM

## 2022-04-11 DIAGNOSIS — K52.832 LYMPHOCYTIC COLITIS: Primary | ICD-10-CM

## 2022-04-11 PROCEDURE — 99213 OFFICE O/P EST LOW 20 MIN: CPT | Performed by: SURGERY

## 2022-04-11 RX ORDER — ROPINIROLE 2 MG/1
TABLET, FILM COATED ORAL
COMMUNITY
Start: 2022-04-05

## 2022-04-11 RX ORDER — CHOLESTYRAMINE 4 G/9G
1 POWDER, FOR SUSPENSION ORAL 2 TIMES DAILY WITH MEALS
Qty: 60 PACKET | Refills: 2 | Status: SHIPPED | OUTPATIENT
Start: 2022-04-11 | End: 2022-04-25

## 2022-04-25 RX ORDER — CHOLESTYRAMINE 4 G/9G
POWDER, FOR SUSPENSION ORAL
Qty: 60 EACH | Refills: 3 | Status: SHIPPED | OUTPATIENT
Start: 2022-04-25 | End: 2023-02-21

## 2022-05-24 ENCOUNTER — TELEPHONE (OUTPATIENT)
Dept: FAMILY MEDICINE CLINIC | Facility: CLINIC | Age: 67
End: 2022-05-24

## 2022-05-24 NOTE — TELEPHONE ENCOUNTER
Pt called and made apt with . She states she has been having wheezing and possible development of asthma. She says the symptoms began five or six days ago. Scheduled for tomorrow. Ok to keep?   Future Appointments   Date Time Provider Mike Molina   5/25/2022  9:40 AM Shubham Sage MD [facility] [address] [facility] [address]

## 2022-05-25 ENCOUNTER — HOSPITAL ENCOUNTER (OUTPATIENT)
Dept: GENERAL RADIOLOGY | Age: 67
Discharge: HOME | End: 2022-05-25
Attending: FAMILY MEDICINE
Payer: MEDICARE

## 2022-05-25 ENCOUNTER — OFFICE VISIT (OUTPATIENT)
Dept: FAMILY MEDICINE CLINIC | Facility: CLINIC | Age: 67
End: 2022-05-25
Payer: MEDICARE

## 2022-05-25 VITALS
RESPIRATION RATE: 18 BRPM | OXYGEN SATURATION: 99 % | TEMPERATURE: 97 F | HEART RATE: 72 BPM | SYSTOLIC BLOOD PRESSURE: 120 MMHG | DIASTOLIC BLOOD PRESSURE: 78 MMHG

## 2022-05-25 DIAGNOSIS — R05.9 COUGH: ICD-10-CM

## 2022-05-25 DIAGNOSIS — Z77.098 EXPOSURE TO CHEMICAL INHALATION: ICD-10-CM

## 2022-05-25 DIAGNOSIS — R05.9 COUGH: Primary | ICD-10-CM

## 2022-05-25 DIAGNOSIS — Z76.0 MEDICATION REFILL: ICD-10-CM

## 2022-05-25 DIAGNOSIS — E78.5 DYSLIPIDEMIA: ICD-10-CM

## 2022-05-25 PROCEDURE — 71046 X-RAY EXAM CHEST 2 VIEWS: CPT | Performed by: FAMILY MEDICINE

## 2022-05-25 PROCEDURE — 3074F SYST BP LT 130 MM HG: CPT | Performed by: FAMILY MEDICINE

## 2022-05-25 PROCEDURE — 3078F DIAST BP <80 MM HG: CPT | Performed by: FAMILY MEDICINE

## 2022-05-25 PROCEDURE — 99214 OFFICE O/P EST MOD 30 MIN: CPT | Performed by: FAMILY MEDICINE

## 2022-05-25 RX ORDER — ALBUTEROL SULFATE 90 UG/1
2 AEROSOL, METERED RESPIRATORY (INHALATION) EVERY 4 HOURS PRN
Qty: 1 EACH | Refills: 0 | Status: SHIPPED | OUTPATIENT
Start: 2022-05-25

## 2022-05-25 RX ORDER — ATORVASTATIN CALCIUM 10 MG/1
10 TABLET, FILM COATED ORAL NIGHTLY
Qty: 90 TABLET | Refills: 3 | Status: SHIPPED | OUTPATIENT
Start: 2022-05-25

## 2022-05-25 RX ORDER — MONTELUKAST SODIUM 10 MG/1
10 TABLET ORAL NIGHTLY
Qty: 90 TABLET | Refills: 3 | Status: SHIPPED | OUTPATIENT
Start: 2022-05-25

## 2022-09-12 ENCOUNTER — OFFICE VISIT (OUTPATIENT)
Dept: FAMILY MEDICINE CLINIC | Facility: CLINIC | Age: 67
End: 2022-09-12
Payer: MEDICARE

## 2022-09-12 VITALS
SYSTOLIC BLOOD PRESSURE: 128 MMHG | HEART RATE: 75 BPM | OXYGEN SATURATION: 100 % | BODY MASS INDEX: 28.69 KG/M2 | RESPIRATION RATE: 16 BRPM | TEMPERATURE: 97 F | WEIGHT: 178.5 LBS | HEIGHT: 66 IN | DIASTOLIC BLOOD PRESSURE: 76 MMHG

## 2022-09-12 DIAGNOSIS — Z91.038 ALLERGIC REACTION TO INSECT BITE: ICD-10-CM

## 2022-09-12 DIAGNOSIS — Z72.0 TOBACCO USE: ICD-10-CM

## 2022-09-12 DIAGNOSIS — J33.9 NASAL POLYPS: ICD-10-CM

## 2022-09-12 DIAGNOSIS — R87.613 HGSIL (HIGH GRADE SQUAMOUS INTRAEPITHELIAL LESION) ON PAP SMEAR OF CERVIX: ICD-10-CM

## 2022-09-12 DIAGNOSIS — K57.90 DIVERTICULOSIS: ICD-10-CM

## 2022-09-12 DIAGNOSIS — Z12.11 COLON CANCER SCREENING: ICD-10-CM

## 2022-09-12 DIAGNOSIS — Z00.00 ROUTINE ADULT HEALTH MAINTENANCE: Primary | ICD-10-CM

## 2022-09-12 DIAGNOSIS — J30.89 ALLERGIC RHINITIS DUE TO DUST MITE: ICD-10-CM

## 2022-09-12 DIAGNOSIS — N95.2 ATROPHIC VAGINITIS: ICD-10-CM

## 2022-09-12 DIAGNOSIS — E78.5 DYSLIPIDEMIA: ICD-10-CM

## 2022-09-12 PROBLEM — S56.912A ELBOW STRAIN, LEFT, INITIAL ENCOUNTER: Status: RESOLVED | Noted: 2020-10-21 | Resolved: 2022-09-12

## 2022-09-12 PROBLEM — M79.632 PAIN OF LEFT FOREARM: Status: RESOLVED | Noted: 2020-09-14 | Resolved: 2022-09-12

## 2022-09-12 PROBLEM — S46.912A ELBOW STRAIN, LEFT, INITIAL ENCOUNTER: Status: RESOLVED | Noted: 2020-10-21 | Resolved: 2022-09-12

## 2022-09-12 PROCEDURE — 3078F DIAST BP <80 MM HG: CPT | Performed by: FAMILY MEDICINE

## 2022-09-12 PROCEDURE — 3008F BODY MASS INDEX DOCD: CPT | Performed by: FAMILY MEDICINE

## 2022-09-12 PROCEDURE — 99397 PER PM REEVAL EST PAT 65+ YR: CPT | Performed by: FAMILY MEDICINE

## 2022-09-12 PROCEDURE — G0009 ADMIN PNEUMOCOCCAL VACCINE: HCPCS | Performed by: FAMILY MEDICINE

## 2022-09-12 PROCEDURE — 3074F SYST BP LT 130 MM HG: CPT | Performed by: FAMILY MEDICINE

## 2022-09-12 PROCEDURE — 96160 PT-FOCUSED HLTH RISK ASSMT: CPT | Performed by: FAMILY MEDICINE

## 2022-09-12 PROCEDURE — 90677 PCV20 VACCINE IM: CPT | Performed by: FAMILY MEDICINE

## 2022-09-12 PROCEDURE — 1125F AMNT PAIN NOTED PAIN PRSNT: CPT | Performed by: FAMILY MEDICINE

## 2022-09-12 PROCEDURE — G0439 PPPS, SUBSEQ VISIT: HCPCS | Performed by: FAMILY MEDICINE

## 2022-09-12 RX ORDER — ESTRADIOL 10 UG/1
10 INSERT VAGINAL
COMMUNITY
Start: 2022-06-27 | End: 2023-06-27

## 2022-10-24 ENCOUNTER — TELEPHONE (OUTPATIENT)
Dept: FAMILY MEDICINE CLINIC | Facility: CLINIC | Age: 67
End: 2022-10-24

## 2022-10-24 DIAGNOSIS — Z00.00 LABORATORY EXAMINATION ORDERED AS PART OF A ROUTINE GENERAL MEDICAL EXAMINATION: Primary | ICD-10-CM

## 2022-10-27 LAB
ABSOLUTE BASOPHILS: 68 CELLS/UL (ref 0–200)
ABSOLUTE EOSINOPHILS: 200 CELLS/UL (ref 15–500)
ABSOLUTE LYMPHOCYTES: 1796 CELLS/UL (ref 850–3900)
ABSOLUTE MONOCYTES: 439 CELLS/UL (ref 200–950)
ABSOLUTE NEUTROPHILS: 3198 CELLS/UL (ref 1500–7800)
ALBUMIN/GLOBULIN RATIO: 1.9 (CALC) (ref 1–2.5)
ALBUMIN: 4.3 G/DL (ref 3.6–5.1)
ALKALINE PHOSPHATASE: 68 U/L (ref 37–153)
ALT: 20 U/L (ref 6–29)
AST: 18 U/L (ref 10–35)
BASOPHILS: 1.2 %
BILIRUBIN, TOTAL: 0.6 MG/DL (ref 0.2–1.2)
BUN: 20 MG/DL (ref 7–25)
CALCIUM: 9.7 MG/DL (ref 8.6–10.4)
CARBON DIOXIDE: 29 MMOL/L (ref 20–32)
CHLORIDE: 107 MMOL/L (ref 98–110)
CHOL/HDLC RATIO: 3.4 (CALC)
CHOLESTEROL, TOTAL: 211 MG/DL
CREATININE: 0.86 MG/DL (ref 0.5–1.05)
EGFR: 74 ML/MIN/1.73M2
EOSINOPHILS: 3.5 %
GLOBULIN: 2.3 G/DL (CALC) (ref 1.9–3.7)
GLUCOSE: 86 MG/DL (ref 65–99)
HDL CHOLESTEROL: 62 MG/DL
HEMATOCRIT: 42.2 % (ref 35–45)
HEMOGLOBIN: 14.1 G/DL (ref 11.7–15.5)
LDL-CHOLESTEROL: 122 MG/DL (CALC)
LYMPHOCYTES: 31.5 %
MCH: 31.9 PG (ref 27–33)
MCHC: 33.4 G/DL (ref 32–36)
MCV: 95.5 FL (ref 80–100)
MONOCYTES: 7.7 %
MPV: 9.8 FL (ref 7.5–12.5)
NEUTROPHILS: 56.1 %
NON-HDL CHOLESTEROL: 149 MG/DL (CALC)
PLATELET COUNT: 217 THOUSAND/UL (ref 140–400)
POTASSIUM: 4.3 MMOL/L (ref 3.5–5.3)
PROTEIN, TOTAL: 6.6 G/DL (ref 6.1–8.1)
RDW: 12.4 % (ref 11–15)
RED BLOOD CELL COUNT: 4.42 MILLION/UL (ref 3.8–5.1)
SODIUM: 142 MMOL/L (ref 135–146)
T4, FREE: 1 NG/DL (ref 0.8–1.8)
TRIGLYCERIDES: 152 MG/DL
TSH: 2.32 MIU/L (ref 0.4–4.5)
WHITE BLOOD CELL COUNT: 5.7 THOUSAND/UL (ref 3.8–10.8)

## 2022-10-28 DIAGNOSIS — E78.5 DYSLIPIDEMIA: Primary | ICD-10-CM

## 2022-11-08 ENCOUNTER — TELEPHONE (OUTPATIENT)
Dept: FAMILY MEDICINE CLINIC | Facility: CLINIC | Age: 67
End: 2022-11-08

## 2022-11-08 NOTE — TELEPHONE ENCOUNTER
Patient requesting appointment, has poss sinus infection. Patient tested negative for covid twice, has had low grade temp.  Please advise

## 2022-11-09 ENCOUNTER — OFFICE VISIT (OUTPATIENT)
Dept: FAMILY MEDICINE CLINIC | Facility: CLINIC | Age: 67
End: 2022-11-09
Payer: MEDICARE

## 2022-11-09 VITALS
HEART RATE: 80 BPM | RESPIRATION RATE: 18 BRPM | SYSTOLIC BLOOD PRESSURE: 120 MMHG | DIASTOLIC BLOOD PRESSURE: 70 MMHG | TEMPERATURE: 98 F

## 2022-11-09 DIAGNOSIS — J01.00 ACUTE NON-RECURRENT MAXILLARY SINUSITIS: Primary | ICD-10-CM

## 2022-11-09 PROCEDURE — 99213 OFFICE O/P EST LOW 20 MIN: CPT | Performed by: FAMILY MEDICINE

## 2022-11-09 PROCEDURE — 3074F SYST BP LT 130 MM HG: CPT | Performed by: FAMILY MEDICINE

## 2022-11-09 PROCEDURE — 3078F DIAST BP <80 MM HG: CPT | Performed by: FAMILY MEDICINE

## 2022-11-09 RX ORDER — AMOXICILLIN AND CLAVULANATE POTASSIUM 875; 125 MG/1; MG/1
1 TABLET, FILM COATED ORAL 2 TIMES DAILY
Qty: 20 TABLET | Refills: 0 | Status: SHIPPED | OUTPATIENT
Start: 2022-11-09 | End: 2022-11-19

## 2022-11-09 RX ORDER — METHYLPREDNISOLONE 4 MG/1
TABLET ORAL
Qty: 21 TABLET | Refills: 0 | Status: SHIPPED | OUTPATIENT
Start: 2022-11-09

## 2022-12-05 ENCOUNTER — HOSPITAL ENCOUNTER (OUTPATIENT)
Dept: MAMMOGRAPHY | Age: 67
Discharge: HOME | End: 2022-12-05
Attending: FAMILY MEDICINE
Payer: MEDICARE

## 2022-12-05 DIAGNOSIS — Z00.00 ROUTINE ADULT HEALTH MAINTENANCE: ICD-10-CM

## 2022-12-05 PROCEDURE — 77063 BREAST TOMOSYNTHESIS BI: CPT | Performed by: FAMILY MEDICINE

## 2022-12-05 PROCEDURE — 77067 SCR MAMMO BI INCL CAD: CPT | Performed by: FAMILY MEDICINE

## 2022-12-09 ENCOUNTER — OFFICE VISIT (OUTPATIENT)
Dept: FAMILY MEDICINE CLINIC | Facility: CLINIC | Age: 67
End: 2022-12-09
Payer: MEDICARE

## 2022-12-09 VITALS
BODY MASS INDEX: 29 KG/M2 | RESPIRATION RATE: 16 BRPM | SYSTOLIC BLOOD PRESSURE: 110 MMHG | OXYGEN SATURATION: 98 % | HEART RATE: 81 BPM | TEMPERATURE: 98 F | DIASTOLIC BLOOD PRESSURE: 58 MMHG | HEIGHT: 66 IN

## 2022-12-09 DIAGNOSIS — J32.9 CHRONIC SINUSITIS, UNSPECIFIED LOCATION: Primary | ICD-10-CM

## 2022-12-09 DIAGNOSIS — Z76.0 MEDICATION REFILL: ICD-10-CM

## 2022-12-09 DIAGNOSIS — E78.5 DYSLIPIDEMIA: ICD-10-CM

## 2022-12-09 RX ORDER — CLINDAMYCIN HYDROCHLORIDE 150 MG/1
300 CAPSULE ORAL 3 TIMES DAILY
Qty: 30 CAPSULE | Refills: 0 | Status: SHIPPED | OUTPATIENT
Start: 2022-12-09 | End: 2022-12-19

## 2022-12-09 RX ORDER — METHYLPREDNISOLONE 4 MG/1
TABLET ORAL
Qty: 1 EACH | Refills: 0 | Status: SHIPPED | OUTPATIENT
Start: 2022-12-09

## 2022-12-09 RX ORDER — ATORVASTATIN CALCIUM 10 MG/1
10 TABLET, FILM COATED ORAL NIGHTLY
Qty: 90 TABLET | Refills: 3 | Status: SHIPPED | OUTPATIENT
Start: 2022-12-09

## 2022-12-09 RX ORDER — MONTELUKAST SODIUM 10 MG/1
10 TABLET ORAL NIGHTLY
Qty: 90 TABLET | Refills: 3 | Status: SHIPPED | OUTPATIENT
Start: 2022-12-09

## 2022-12-16 ENCOUNTER — TELEPHONE (OUTPATIENT)
Dept: FAMILY MEDICINE CLINIC | Facility: CLINIC | Age: 67
End: 2022-12-16

## 2022-12-16 DIAGNOSIS — J32.9 CHRONIC SINUSITIS, UNSPECIFIED LOCATION: ICD-10-CM

## 2022-12-16 RX ORDER — CLINDAMYCIN HYDROCHLORIDE 150 MG/1
300 CAPSULE ORAL 3 TIMES DAILY
Qty: 30 CAPSULE | Refills: 0 | Status: SHIPPED | OUTPATIENT
Start: 2022-12-16

## 2023-02-15 ENCOUNTER — OFFICE VISIT (OUTPATIENT)
Dept: FAMILY MEDICINE CLINIC | Facility: CLINIC | Age: 68
End: 2023-02-15
Payer: MEDICARE

## 2023-02-15 VITALS
DIASTOLIC BLOOD PRESSURE: 80 MMHG | SYSTOLIC BLOOD PRESSURE: 130 MMHG | HEART RATE: 69 BPM | RESPIRATION RATE: 16 BRPM | OXYGEN SATURATION: 98 % | TEMPERATURE: 97 F

## 2023-02-15 DIAGNOSIS — J01.00 ACUTE NON-RECURRENT MAXILLARY SINUSITIS: Primary | ICD-10-CM

## 2023-02-15 DIAGNOSIS — H92.09 OTALGIA, UNSPECIFIED LATERALITY: ICD-10-CM

## 2023-02-15 PROCEDURE — 99214 OFFICE O/P EST MOD 30 MIN: CPT | Performed by: FAMILY MEDICINE

## 2023-02-15 PROCEDURE — 3075F SYST BP GE 130 - 139MM HG: CPT | Performed by: FAMILY MEDICINE

## 2023-02-15 PROCEDURE — 3079F DIAST BP 80-89 MM HG: CPT | Performed by: FAMILY MEDICINE

## 2023-02-15 RX ORDER — METHYLPREDNISOLONE 4 MG/1
TABLET ORAL
Qty: 21 TABLET | Refills: 0 | Status: SHIPPED | OUTPATIENT
Start: 2023-02-15

## 2023-02-15 RX ORDER — AMOXICILLIN AND CLAVULANATE POTASSIUM 500; 125 MG/1; MG/1
1 TABLET, FILM COATED ORAL 2 TIMES DAILY
Qty: 20 TABLET | Refills: 0 | Status: SHIPPED | OUTPATIENT
Start: 2023-02-15 | End: 2023-02-25

## 2023-02-21 DIAGNOSIS — E03.9 ACQUIRED HYPOTHYROIDISM: Primary | ICD-10-CM

## 2023-02-21 RX ORDER — CHOLESTYRAMINE 4 G/9G
POWDER, FOR SUSPENSION ORAL
Qty: 60 EACH | Refills: 3 | Status: SHIPPED | OUTPATIENT
Start: 2023-02-21

## 2023-02-22 ENCOUNTER — TELEPHONE (OUTPATIENT)
Dept: SURGERY | Facility: CLINIC | Age: 68
End: 2023-02-22
Payer: MEDICARE

## 2023-02-23 NOTE — TELEPHONE ENCOUNTER
Caller: Kenya Apodaca    Relationship to patient: Self    Best call back number: [phone]    Patient is needing: PT CALLED BACK. PLEASE GIVE HER A CALL BACK.

## 2023-03-06 ENCOUNTER — TELEPHONE (OUTPATIENT)
Dept: SURGERY | Facility: CLINIC | Age: 68
End: 2023-03-06
Payer: MEDICARE

## 2023-03-06 NOTE — TELEPHONE ENCOUNTER
----- Message from Mitch Carbajal MD sent at 2/21/2023  9:43 AM EST -----  I reordered that medication but I would like her to get her thyroid studies done to make sure that her thyroid medicine is working as the cholestyramine can affect its absorption.    I talked with pt, she will go to [facility] for thyroid function test lab work.

## 2023-04-10 ENCOUNTER — OFFICE VISIT (OUTPATIENT)
Dept: FAMILY MEDICINE CLINIC | Facility: CLINIC | Age: 68
End: 2023-04-10
Payer: MEDICARE

## 2023-04-10 VITALS
TEMPERATURE: 98 F | SYSTOLIC BLOOD PRESSURE: 136 MMHG | DIASTOLIC BLOOD PRESSURE: 80 MMHG | RESPIRATION RATE: 18 BRPM | OXYGEN SATURATION: 98 % | HEART RATE: 88 BPM

## 2023-04-10 DIAGNOSIS — J01.00 ACUTE NON-RECURRENT MAXILLARY SINUSITIS: Primary | ICD-10-CM

## 2023-04-10 PROCEDURE — 99213 OFFICE O/P EST LOW 20 MIN: CPT | Performed by: FAMILY MEDICINE

## 2023-04-10 PROCEDURE — 3079F DIAST BP 80-89 MM HG: CPT | Performed by: FAMILY MEDICINE

## 2023-04-10 PROCEDURE — 3075F SYST BP GE 130 - 139MM HG: CPT | Performed by: FAMILY MEDICINE

## 2023-04-10 RX ORDER — AMOXICILLIN AND CLAVULANATE POTASSIUM 500; 125 MG/1; MG/1
1 TABLET, FILM COATED ORAL 2 TIMES DAILY
Qty: 20 TABLET | Refills: 0 | Status: SHIPPED | OUTPATIENT
Start: 2023-04-10 | End: 2023-04-20

## 2023-04-10 RX ORDER — METHYLPREDNISOLONE 4 MG/1
TABLET ORAL
Qty: 21 TABLET | Refills: 0 | Status: SHIPPED | OUTPATIENT
Start: 2023-04-10

## 2023-04-24 DIAGNOSIS — Z76.0 MEDICATION REFILL: ICD-10-CM

## 2023-04-24 RX ORDER — MONTELUKAST SODIUM 10 MG/1
10 TABLET ORAL NIGHTLY
Qty: 90 TABLET | Refills: 3 | Status: SHIPPED | OUTPATIENT
Start: 2023-04-24

## 2023-04-25 RX ORDER — MONTELUKAST SODIUM 10 MG/1
TABLET ORAL
Qty: 90 TABLET | Refills: 3 | OUTPATIENT
Start: 2023-04-25

## 2023-04-28 ENCOUNTER — HOSPITAL ENCOUNTER (OUTPATIENT)
Facility: HOSPITAL | Age: 68
Setting detail: HOSPITAL OUTPATIENT SURGERY
Discharge: HOME | End: 2023-04-28
Attending: INTERNAL MEDICINE | Admitting: INTERNAL MEDICINE
Payer: MEDICARE

## 2023-04-28 ENCOUNTER — ANESTHESIA (OUTPATIENT)
Dept: ENDOSCOPY | Facility: HOSPITAL | Age: 68
End: 2023-04-28
Payer: MEDICARE

## 2023-04-28 ENCOUNTER — ANESTHESIA EVENT (OUTPATIENT)
Dept: ENDOSCOPY | Facility: HOSPITAL | Age: 68
End: 2023-04-28
Payer: MEDICARE

## 2023-04-28 VITALS
HEIGHT: 65 IN | DIASTOLIC BLOOD PRESSURE: 65 MMHG | TEMPERATURE: 97 F | BODY MASS INDEX: 27.99 KG/M2 | RESPIRATION RATE: 19 BRPM | OXYGEN SATURATION: 98 % | HEART RATE: 55 BPM | SYSTOLIC BLOOD PRESSURE: 101 MMHG | WEIGHT: 168 LBS

## 2023-04-28 DIAGNOSIS — Z86.010 PERSONAL HISTORY OF COLONIC POLYPS: ICD-10-CM

## 2023-04-28 PROCEDURE — 0DBL8ZX EXCISION OF TRANSVERSE COLON, VIA NATURAL OR ARTIFICIAL OPENING ENDOSCOPIC, DIAGNOSTIC: ICD-10-PCS | Performed by: INTERNAL MEDICINE

## 2023-04-28 PROCEDURE — 88305 TISSUE EXAM BY PATHOLOGIST: CPT | Performed by: INTERNAL MEDICINE

## 2023-04-28 PROCEDURE — 0DBP8ZX EXCISION OF RECTUM, VIA NATURAL OR ARTIFICIAL OPENING ENDOSCOPIC, DIAGNOSTIC: ICD-10-PCS | Performed by: INTERNAL MEDICINE

## 2023-04-28 RX ORDER — SODIUM CHLORIDE, SODIUM LACTATE, POTASSIUM CHLORIDE, CALCIUM CHLORIDE 600; 310; 30; 20 MG/100ML; MG/100ML; MG/100ML; MG/100ML
INJECTION, SOLUTION INTRAVENOUS CONTINUOUS
OUTPATIENT
Start: 2023-04-28

## 2023-04-28 RX ORDER — NALOXONE HYDROCHLORIDE 0.4 MG/ML
80 INJECTION, SOLUTION INTRAMUSCULAR; INTRAVENOUS; SUBCUTANEOUS AS NEEDED
OUTPATIENT
Start: 2023-04-28 | End: 2023-04-28

## 2023-04-28 RX ORDER — SODIUM CHLORIDE, SODIUM LACTATE, POTASSIUM CHLORIDE, CALCIUM CHLORIDE 600; 310; 30; 20 MG/100ML; MG/100ML; MG/100ML; MG/100ML
INJECTION, SOLUTION INTRAVENOUS CONTINUOUS
Status: DISCONTINUED | OUTPATIENT
Start: 2023-04-28 | End: 2023-04-28

## 2023-04-28 NOTE — DISCHARGE INSTRUCTIONS
Home Care Instructions for Colonoscopy With Sedation    Diet:  - Resume your regular diet as tolerated unless otherwise instructed. - start with light meals to minimize bloating.  - Do not drink alcohol today. Medication:  - If you have questions about resuming your normal medications, please contact your Primary Care Physician. Activities:  - Take it easy today. Do not return to work today. - Do not drive today. - Do not operate any machinery today (including kitchen equipment). Colonoscopy:  - You may notice some rectal \"spotting\" (a little blood on the toilet tissue) for a day or two after the exam. This is normal.  - If you experience any rectal bleeding (not spotting), persistent tenderness or sharp severe abdominal pains, oral temperature over 100 degrees Farenheit, light-headedness or dizziness, or any other problems, contact your doctor. **If unable to reach your doctor, please go to the [facility] Emergency Room**    - Your referring physician will receive a full report of your examination.  - If you do not hear from your doctor's office within two weeks of your biopsy, please call them for your results.     Additional Comments/Instructions (if applicable):

## 2023-04-28 NOTE — ANESTHESIA POSTPROCEDURE EVALUATION
[id number] Patient Status:  Hospital Outpatient Surgery   Age/Gender 79year old female MRN [id number]   Location [address] [facility] 28 Attending Niki Garcia, [address] Day # 0 PCP Rodrigo Gaytan MD       Anesthesia Post-op Note    COLONOSCOPY with cold snare polypectomy    Procedure Summary     Date: 04/28/23 Room / Location: [facility] ENDOSCOPY 02 / [facility] ENDOSCOPY    Anesthesia Start: 1542 Anesthesia Stop: 0030    Procedure: COLONOSCOPY with cold snare polypectomy Diagnosis:       Personal history of colonic polyps      (colon polyps, diverticulosis, hemorrhoids)    Surgeons: Niki Garcia DO Anesthesiologist: Nellie Linda MD    Anesthesia Type: MAC ASA Status: 2          Anesthesia Type: MAC    Vitals Value Taken Time   /68 04/28/23 0739   Temp na 04/28/23 0739   Pulse 68 04/28/23 0739   Resp 12 04/28/23 0739   SpO2 100 04/28/23 0739       Patient Location: Endoscopy    Anesthesia Type: MAC    Airway Patency: patent    Postop Pain Control: adequate    Mental Status: preanesthetic baseline    Nausea/Vomiting: none    Cardiopulmonary/Hydration status: stable euvolemic    Complications: no apparent anesthesia related complications    Postop vital signs: stable    Dental Exam: Unchanged from Preop    Patient to be discharged home when criteria met.

## 2023-06-16 ENCOUNTER — OFFICE VISIT (OUTPATIENT)
Dept: FAMILY MEDICINE CLINIC | Facility: CLINIC | Age: 68
End: 2023-06-16
Payer: MEDICARE

## 2023-06-16 VITALS
HEART RATE: 74 BPM | BODY MASS INDEX: 28 KG/M2 | HEIGHT: 65 IN | RESPIRATION RATE: 16 BRPM | TEMPERATURE: 97 F | DIASTOLIC BLOOD PRESSURE: 74 MMHG | SYSTOLIC BLOOD PRESSURE: 110 MMHG | OXYGEN SATURATION: 98 %

## 2023-06-16 DIAGNOSIS — R05.1 ACUTE COUGH: ICD-10-CM

## 2023-06-16 DIAGNOSIS — J32.1 CHRONIC FRONTAL SINUSITIS: Primary | ICD-10-CM

## 2023-06-16 PROCEDURE — 3008F BODY MASS INDEX DOCD: CPT | Performed by: FAMILY MEDICINE

## 2023-06-16 PROCEDURE — 1170F FXNL STATUS ASSESSED: CPT | Performed by: FAMILY MEDICINE

## 2023-06-16 PROCEDURE — 99213 OFFICE O/P EST LOW 20 MIN: CPT | Performed by: FAMILY MEDICINE

## 2023-06-16 PROCEDURE — 1159F MED LIST DOCD IN RCRD: CPT | Performed by: FAMILY MEDICINE

## 2023-06-16 PROCEDURE — 3074F SYST BP LT 130 MM HG: CPT | Performed by: FAMILY MEDICINE

## 2023-06-16 PROCEDURE — 1160F RVW MEDS BY RX/DR IN RCRD: CPT | Performed by: FAMILY MEDICINE

## 2023-06-16 PROCEDURE — 3078F DIAST BP <80 MM HG: CPT | Performed by: FAMILY MEDICINE

## 2023-06-16 RX ORDER — METHYLPREDNISOLONE 4 MG/1
TABLET ORAL
Qty: 21 EACH | Refills: 0 | Status: SHIPPED | OUTPATIENT
Start: 2023-06-16

## 2023-06-16 RX ORDER — AMOXICILLIN AND CLAVULANATE POTASSIUM 875; 125 MG/1; MG/1
1 TABLET, FILM COATED ORAL 2 TIMES DAILY
Qty: 20 TABLET | Refills: 0 | Status: SHIPPED | OUTPATIENT
Start: 2023-06-16 | End: 2023-06-26

## 2023-06-16 RX ORDER — ALBUTEROL SULFATE 90 UG/1
2 AEROSOL, METERED RESPIRATORY (INHALATION) EVERY 4 HOURS PRN
Qty: 1 EACH | Refills: 0 | Status: SHIPPED | OUTPATIENT
Start: 2023-06-16

## 2023-07-18 ENCOUNTER — PATIENT MESSAGE (OUTPATIENT)
Dept: FAMILY MEDICINE CLINIC | Facility: CLINIC | Age: 68
End: 2023-07-18

## 2023-07-18 DIAGNOSIS — E78.5 DYSLIPIDEMIA: ICD-10-CM

## 2023-07-18 DIAGNOSIS — Z76.0 MEDICATION REFILL: ICD-10-CM

## 2023-07-18 RX ORDER — ATORVASTATIN CALCIUM 10 MG/1
10 TABLET, FILM COATED ORAL NIGHTLY
Qty: 90 TABLET | Refills: 3 | Status: SHIPPED | OUTPATIENT
Start: 2023-07-18

## 2023-07-18 NOTE — TELEPHONE ENCOUNTER
atorvastatin 10 MG Oral Tab         Sig: Take 1 tablet (10 mg total) by mouth nightly. Disp: 90 tablet    Refills: 3    Start: 7/18/2023    Class: N/A    Non-formulary For: Dyslipidemia, Medication refill    Last ordered: 7 months ago by EVERARDO Arrieta     Cholesterol Medication Protocol [id number]/18/2023 02:44 PM   Protocol Details ALT < 80    ALT resulted within past year    Lipid panel within past 12 months    Appointment within past 12 or next 3 months        LOV: 06/16/2023  RTC: Desirae Rosales if not improved in one week or sooner if worsening  Last Relevant Labs: 10/27/2022  Filled: 12/09/2022 #90 with 3 refills    Future Appointments   Date Time Provider Mike Molina   9/20/2023  9:00 AM Lyudmila Tsang MD [facility] [address] [facility] [address]     Refill request approved per protocol.

## 2023-07-18 NOTE — TELEPHONE ENCOUNTER
From: Ilsa Dykes  To: EVERARDO Collazo  Sent: 7/18/2023 11:11 AM CDT  Subject: Atorvastatin    Please refill my 10 mg Atorvastatin at [facility] mail order. Thank you in advance.

## 2023-09-20 ENCOUNTER — TELEPHONE (OUTPATIENT)
Dept: FAMILY MEDICINE CLINIC | Facility: CLINIC | Age: 68
End: 2023-09-20

## 2023-09-20 ENCOUNTER — OFFICE VISIT (OUTPATIENT)
Dept: FAMILY MEDICINE CLINIC | Facility: CLINIC | Age: 68
End: 2023-09-20
Payer: MEDICARE

## 2023-09-20 VITALS
HEART RATE: 69 BPM | HEIGHT: 64.17 IN | RESPIRATION RATE: 18 BRPM | WEIGHT: 179 LBS | SYSTOLIC BLOOD PRESSURE: 104 MMHG | OXYGEN SATURATION: 99 % | TEMPERATURE: 98 F | BODY MASS INDEX: 30.56 KG/M2 | DIASTOLIC BLOOD PRESSURE: 60 MMHG

## 2023-09-20 DIAGNOSIS — Z13.0 SCREENING, ANEMIA, DEFICIENCY, IRON: ICD-10-CM

## 2023-09-20 DIAGNOSIS — R06.02 SHORTNESS OF BREATH: ICD-10-CM

## 2023-09-20 DIAGNOSIS — I45.10 INCOMPLETE RIGHT BUNDLE BRANCH BLOCK: ICD-10-CM

## 2023-09-20 DIAGNOSIS — Z13.29 SCREENING FOR THYROID DISORDER: ICD-10-CM

## 2023-09-20 DIAGNOSIS — J41.0 SMOKERS' COUGH (HCC): Chronic | ICD-10-CM

## 2023-09-20 DIAGNOSIS — E78.5 DYSLIPIDEMIA: ICD-10-CM

## 2023-09-20 DIAGNOSIS — I83.90 VARICOSE VEINS: ICD-10-CM

## 2023-09-20 DIAGNOSIS — Z11.59 NEED FOR HEPATITIS C SCREENING TEST: ICD-10-CM

## 2023-09-20 DIAGNOSIS — R87.613 HGSIL (HIGH GRADE SQUAMOUS INTRAEPITHELIAL LESION) ON PAP SMEAR OF CERVIX: ICD-10-CM

## 2023-09-20 DIAGNOSIS — Z23 NEED FOR INFLUENZA VACCINATION: ICD-10-CM

## 2023-09-20 DIAGNOSIS — R07.89 OTHER CHEST PAIN: ICD-10-CM

## 2023-09-20 DIAGNOSIS — Z72.0 TOBACCO USE: ICD-10-CM

## 2023-09-20 DIAGNOSIS — Z00.00 ENCOUNTER FOR ANNUAL HEALTH EXAMINATION: Primary | ICD-10-CM

## 2023-09-20 DIAGNOSIS — J30.89 ALLERGIC RHINITIS DUE TO DUST MITE: ICD-10-CM

## 2023-09-20 DIAGNOSIS — Z13.1 SCREENING FOR DIABETES MELLITUS (DM): ICD-10-CM

## 2023-09-20 DIAGNOSIS — N87.1 CIN II (CERVICAL INTRAEPITHELIAL NEOPLASIA II): ICD-10-CM

## 2023-09-20 DIAGNOSIS — Z12.31 ENCOUNTER FOR SCREENING MAMMOGRAM FOR MALIGNANT NEOPLASM OF BREAST: ICD-10-CM

## 2023-09-20 LAB
ATRIAL RATE: 66 BPM
P AXIS: 2 DEGREES
P-R INTERVAL: 150 MS
Q-T INTERVAL: 400 MS
QRS DURATION: 100 MS
QTC CALCULATION (BEZET): 419 MS
R AXIS: -19 DEGREES
T AXIS: 173 DEGREES
VENTRICULAR RATE: 66 BPM

## 2023-09-20 PROCEDURE — 3074F SYST BP LT 130 MM HG: CPT | Performed by: FAMILY MEDICINE

## 2023-09-20 PROCEDURE — 1160F RVW MEDS BY RX/DR IN RCRD: CPT | Performed by: FAMILY MEDICINE

## 2023-09-20 PROCEDURE — 3008F BODY MASS INDEX DOCD: CPT | Performed by: FAMILY MEDICINE

## 2023-09-20 PROCEDURE — G0439 PPPS, SUBSEQ VISIT: HCPCS | Performed by: FAMILY MEDICINE

## 2023-09-20 PROCEDURE — 99215 OFFICE O/P EST HI 40 MIN: CPT | Performed by: FAMILY MEDICINE

## 2023-09-20 PROCEDURE — 1126F AMNT PAIN NOTED NONE PRSNT: CPT | Performed by: FAMILY MEDICINE

## 2023-09-20 PROCEDURE — 93000 ELECTROCARDIOGRAM COMPLETE: CPT | Performed by: FAMILY MEDICINE

## 2023-09-20 PROCEDURE — 96160 PT-FOCUSED HLTH RISK ASSMT: CPT | Performed by: FAMILY MEDICINE

## 2023-09-20 PROCEDURE — 90662 IIV NO PRSV INCREASED AG IM: CPT | Performed by: FAMILY MEDICINE

## 2023-09-20 PROCEDURE — 1159F MED LIST DOCD IN RCRD: CPT | Performed by: FAMILY MEDICINE

## 2023-09-20 PROCEDURE — 3078F DIAST BP <80 MM HG: CPT | Performed by: FAMILY MEDICINE

## 2023-09-20 PROCEDURE — G0008 ADMIN INFLUENZA VIRUS VAC: HCPCS | Performed by: FAMILY MEDICINE

## 2023-09-20 PROCEDURE — 1170F FXNL STATUS ASSESSED: CPT | Performed by: FAMILY MEDICINE

## 2023-09-20 RX ORDER — ATORVASTATIN CALCIUM 10 MG/1
10 TABLET, FILM COATED ORAL NIGHTLY
Qty: 90 TABLET | Refills: 3 | Status: SHIPPED | OUTPATIENT
Start: 2023-09-20

## 2023-09-20 RX ORDER — AZELASTINE 1 MG/ML
2 SPRAY, METERED NASAL 2 TIMES DAILY
Qty: 30 ML | Refills: 3 | Status: SHIPPED | OUTPATIENT
Start: 2023-09-20 | End: 2023-09-20

## 2023-09-20 RX ORDER — MONTELUKAST SODIUM 10 MG/1
10 TABLET ORAL NIGHTLY
Qty: 90 TABLET | Refills: 3 | Status: SHIPPED | OUTPATIENT
Start: 2023-09-20

## 2023-09-20 RX ORDER — AZELASTINE 1 MG/ML
2 SPRAY, METERED NASAL 2 TIMES DAILY
Qty: 30 ML | Refills: 5 | Status: SHIPPED | OUTPATIENT
Start: 2023-09-20

## 2023-09-20 RX ORDER — ATORVASTATIN CALCIUM 10 MG/1
10 TABLET, FILM COATED ORAL NIGHTLY
Qty: 90 TABLET | Refills: 3 | Status: SHIPPED | OUTPATIENT
Start: 2023-09-20 | End: 2023-09-20

## 2023-09-20 NOTE — TELEPHONE ENCOUNTER
Insurance rep calling requesting a status on patients referral to see Dr. Lyle Schlatter . Aware referral is still pending, states patient would like to schedule with specialist Dr. Lyle Schlatter asa. Please advise patient once referral is in.

## 2023-09-20 NOTE — PATIENT INSTRUCTIONS
Go to the [address] [place] [address] lab for your fasting blood tests. Do not eat or drink except for water for at least 8 hours prior to the blood tests. Do not take any vitamins or Biotin for 3 days before your blood tests. Continue your medications as prescribed. Try the azelastine nasal spray 2 sprays each nostril twice daily for your allergy symptoms. Use your albuterol inhaler 2 puffs every 4 hours as needed for chest tightness, shortness of breath or wheezing. Go to the ER if you have increased difficulty breathing, worsening chest pain or it radiates to the jaw, arm or back or any other worsening symptoms. Schedule your mammogram as ordered. Schedule your Chest xray as ordered. Schedule your pulmonary function tests as ordered. Schedule your echocardiogram as ordered. Make an appointment with the cardiologist, Dr. Calvin Stokes or one of his partners. Make your appointment with your gynecologist for your pap smear. You may use the nicotine lozenges over the counter when you are ready to quit smoking. Get the new COVID booster and the RSV vaccine in October at your pharmacy. You received the Flu vaccines today. You may run a low grade fever, have mild redness or swelling at the site of the shot, muscle pain at the site of the shot for the next 2-3 days. You may take Tylenol or Ibuprofen as needed. Use your arm to help decrease pain and swelling. You can apply ice to any swelling for 10-15 minutes twice daily through clothing or a towel. See me when you return from [place] or sooner for any new or worsening symptoms. Yessi Spence's SCREENING SCHEDULE   Tests on this list are recommended by your physician but may not be covered, or covered at this frequency, by your insurer. Please check with your insurance carrier before scheduling to verify coverage.    PREVENTATIVE SERVICES FREQUENCY &  COVERAGE DETAILS LAST COMPLETION DATE   Diabetes Screening    Fasting Blood Sugar /  Glucose    One screening every 12 months if never tested or if previously tested but not diagnosed with pre-diabetes   One screening every 6 months if diagnosed with pre-diabetes Lab Results   Component Value Date    GLU 86 10/26/2022        Cardiovascular Disease Screening    Lipid Panel  Cholesterol  Lipoprotein (HDL)  Triglycerides Covered every 5 years for all Medicare beneficiaries without apparent signs or symptoms of cardiovascular disease Lab Results   Component Value Date    CHOLEST 211 (H) 10/26/2022    HDL 62 10/26/2022     (H) 10/26/2022    TRIG 152 (H) 10/26/2022         Electrocardiogram (EKG)   Covered if needed at Welcome to Medicare, and non-screening if indicated for medical reasons -      Ultrasound Screening for Abdominal Aortic Aneurysm (AAA) Covered once in a lifetime for one of the following risk factors    Men who are 73-68 years old and have ever smoked    Anyone with a family history -     Colorectal Cancer Screening  Covered for ages 52-80; only need ONE of the following:    Colonoscopy   Covered every 10 years    Covered every 2 years if patient is at high risk or previous colonoscopy was abnormal 04/28/2023    Colorectal Cancer Screening due on 04/28/2030    Flexible Sigmoidoscopy   Covered every 4 years -    Fecal Occult Blood Test Covered annually -   Bone Density Screening    Bone density screening    Covered every 2 years after age 72 if diagnosed with risk of osteoporosis or estrogen deficiency. Covered yearly for long-term glucocorticoid medication use (Steroids) Last Dexa Scan:    XR DEXA BONE DENSITOMETRY (CPT=77080) 11/24/2020      No recommendations at this time   Pap and Pelvic    Pap   Covered every 2 years for women at normal risk;  Annually if at high risk 09/18/2020  Pap Smear due on 09/18/2023    Chlamydia Annually if high risk -  No recommendations at this time   Screening Mammogram    Mammogram     Recommend annually for all female patients aged 36 and older    One baseline mammogram covered for patients aged 35-39 12/05/2022    Mammogram due on 12/05/2023    Immunizations    Influenza Covered once per flu season  Please get every year 10/11/2022  No recommendations at this time    Pneumococcal Each vaccine ([id number] & [id number]) covered once after 65 Prevnar 13: -    [id number]: -     No recommendations at this time    Hepatitis B One screening covered for patients with certain risk factors   -  No recommendations at this time    Tetanus Toxoid Not covered by Medicare Part B unless medically necessary (cut with metal); may be covered with your pharmacy prescription benefits -    Tetanus, Diptheria and Pertusis TD and TDaP Not covered by Medicare Part B -  No recommendations at this time    Zoster Not covered by Medicare Part B; may be covered with your pharmacy  prescription benefits -  No recommendations at this time

## 2023-09-21 NOTE — TELEPHONE ENCOUNTER
Referral for specialist visits is not required by insurance and the referral was auto authorized. This was faxed to Dr. Rain Bender office.

## 2023-09-29 ENCOUNTER — RT VISIT (OUTPATIENT)
Dept: RESPIRATORY THERAPY | Facility: HOSPITAL | Age: 68
End: 2023-09-29
Attending: FAMILY MEDICINE
Payer: MEDICARE

## 2023-09-29 ENCOUNTER — HOSPITAL ENCOUNTER (OUTPATIENT)
Dept: GENERAL RADIOLOGY | Facility: HOSPITAL | Age: 68
Discharge: HOME | End: 2023-09-29
Attending: FAMILY MEDICINE
Payer: MEDICARE

## 2023-09-29 DIAGNOSIS — R06.02 SHORTNESS OF BREATH: ICD-10-CM

## 2023-09-29 DIAGNOSIS — Z72.0 TOBACCO USE: ICD-10-CM

## 2023-09-29 DIAGNOSIS — J41.0 SMOKERS' COUGH (HCC): Chronic | ICD-10-CM

## 2023-09-29 PROCEDURE — 94010 BREATHING CAPACITY TEST: CPT | Performed by: INTERNAL MEDICINE

## 2023-09-29 PROCEDURE — 71046 X-RAY EXAM CHEST 2 VIEWS: CPT | Performed by: FAMILY MEDICINE

## 2023-09-29 PROCEDURE — 94726 PLETHYSMOGRAPHY LUNG VOLUMES: CPT | Performed by: INTERNAL MEDICINE

## 2023-09-29 PROCEDURE — 94729 DIFFUSING CAPACITY: CPT | Performed by: INTERNAL MEDICINE

## 2023-10-03 ENCOUNTER — PATIENT MESSAGE (OUTPATIENT)
Dept: FAMILY MEDICINE CLINIC | Facility: CLINIC | Age: 68
End: 2023-10-03

## 2023-10-04 NOTE — TELEPHONE ENCOUNTER
From: Anselmo Ronquillo  To: Markus Mahoney  Sent: 10/3/2023 3:40 PM CDT  Subject: PFT    Hi  As per our conversation I had a PFT performed at [facility] last Friday September 29th. Have you received the results yet?

## 2023-10-05 NOTE — PROCEDURES
Pulmonary Function Test:   Findings:  Spirometry: FEV1 is 2.10L, 90% predicted. FVC is 2.59L, 86% predicted and FEV1/ FVC ratio is 0.81. The flow-volume loop demonstrates a normal pattern. MVV is 81, 93%predicted  Lung Volumes: The TLC is 5.61L, 110% predicted. The residual volume 3.02L, 146% predicted. Diffusion Capacity:  The diffusion capacity is 13.22 or 65% predicted and 80% predicted when corrected for alveolar volume. Impression:  There is no airway obstruction on spirometry and visualized on flow-volume loop. Lung volumes appear within normal limits. Diffusion capacity is mildly reduced  improves to normal range when considering alveolar volume. This may represent a normal finding but can be seen in emphysema, interstitial lung disease, pulmonary vascular disease (such as pulmonary hypertension), atelectasis and anemia. If not already performed, would suggest further evaluation as determined clinically. There are no old studies available for comparison  Raeann Cid MD  for asthma or reactive airways.

## 2023-10-24 ENCOUNTER — HOSPITAL ENCOUNTER (OUTPATIENT)
Dept: CV DIAGNOSTICS | Age: 68
Discharge: HOME | End: 2023-10-24
Attending: FAMILY MEDICINE

## 2023-10-24 DIAGNOSIS — I45.10 INCOMPLETE RIGHT BUNDLE BRANCH BLOCK: ICD-10-CM

## 2023-10-24 DIAGNOSIS — R07.89 OTHER CHEST PAIN: ICD-10-CM

## 2023-10-24 DIAGNOSIS — R06.02 SHORTNESS OF BREATH: ICD-10-CM

## 2023-10-24 PROCEDURE — 93306 TTE W/DOPPLER COMPLETE: CPT | Performed by: FAMILY MEDICINE

## 2023-10-25 ENCOUNTER — OFFICE VISIT (OUTPATIENT)
Dept: FAMILY MEDICINE CLINIC | Facility: CLINIC | Age: 68
End: 2023-10-25

## 2023-10-25 VITALS
HEIGHT: 64 IN | DIASTOLIC BLOOD PRESSURE: 60 MMHG | RESPIRATION RATE: 20 BRPM | HEART RATE: 73 BPM | SYSTOLIC BLOOD PRESSURE: 102 MMHG | TEMPERATURE: 98 F | BODY MASS INDEX: 31 KG/M2 | OXYGEN SATURATION: 98 %

## 2023-10-25 DIAGNOSIS — J30.89 ALLERGIC RHINITIS DUE TO DUST MITE: ICD-10-CM

## 2023-10-25 DIAGNOSIS — I45.10 INCOMPLETE RIGHT BUNDLE BRANCH BLOCK: ICD-10-CM

## 2023-10-25 DIAGNOSIS — H69.93 DYSFUNCTION OF BOTH EUSTACHIAN TUBES: Primary | ICD-10-CM

## 2023-10-25 DIAGNOSIS — Z72.0 TOBACCO USE: ICD-10-CM

## 2023-10-25 DIAGNOSIS — R06.02 SHORTNESS OF BREATH: ICD-10-CM

## 2023-10-25 PROCEDURE — 99214 OFFICE O/P EST MOD 30 MIN: CPT | Performed by: FAMILY MEDICINE

## 2023-10-25 PROCEDURE — 1160F RVW MEDS BY RX/DR IN RCRD: CPT | Performed by: FAMILY MEDICINE

## 2023-10-25 PROCEDURE — 1126F AMNT PAIN NOTED NONE PRSNT: CPT | Performed by: FAMILY MEDICINE

## 2023-10-25 PROCEDURE — 3074F SYST BP LT 130 MM HG: CPT | Performed by: FAMILY MEDICINE

## 2023-10-25 PROCEDURE — 3008F BODY MASS INDEX DOCD: CPT | Performed by: FAMILY MEDICINE

## 2023-10-25 PROCEDURE — 1159F MED LIST DOCD IN RCRD: CPT | Performed by: FAMILY MEDICINE

## 2023-10-25 PROCEDURE — 1170F FXNL STATUS ASSESSED: CPT | Performed by: FAMILY MEDICINE

## 2023-10-25 PROCEDURE — 3078F DIAST BP <80 MM HG: CPT | Performed by: FAMILY MEDICINE

## 2023-10-25 RX ORDER — PREDNISONE 20 MG/1
TABLET ORAL
Qty: 10 TABLET | Refills: 0 | Status: SHIPPED | OUTPATIENT
Start: 2023-10-25

## 2023-10-25 NOTE — PATIENT INSTRUCTIONS
Take the Prednisone 20 mg 2 tablets with breakfast for 5 days. Take your prednisone with a full meal and early in the day. Do not take any Ibuprofen, Advil, Motrin, Naproxen, Aspirin while on Prednisone. Tylenol is OK for fever or pain. Continue all your allergy medications. Make an appointment with the cardiologist as we discussed. Stop smoking. Contact the office if your symptoms as worsening.

## 2023-10-26 LAB
ABSOLUTE BASOPHILS: 70 CELLS/UL (ref 0–200)
ABSOLUTE EOSINOPHILS: 244 CELLS/UL (ref 15–500)
ABSOLUTE LYMPHOCYTES: 1781 CELLS/UL (ref 850–3900)
ABSOLUTE MONOCYTES: 441 CELLS/UL (ref 200–950)
ABSOLUTE NEUTROPHILS: 3265 CELLS/UL (ref 1500–7800)
ALBUMIN/GLOBULIN RATIO: 2 (CALC) (ref 1–2.5)
ALBUMIN: 4.3 G/DL (ref 3.6–5.1)
ALKALINE PHOSPHATASE: 78 U/L (ref 37–153)
ALT: 17 U/L (ref 6–29)
AST: 18 U/L (ref 10–35)
BASOPHILS: 1.2 %
BILIRUBIN, TOTAL: 0.7 MG/DL (ref 0.2–1.2)
BUN: 19 MG/DL (ref 7–25)
CALCIUM: 9.3 MG/DL (ref 8.6–10.4)
CARBON DIOXIDE: 29 MMOL/L (ref 20–32)
CHLORIDE: 109 MMOL/L (ref 98–110)
CHOL/HDLC RATIO: 3.2 (CALC)
CHOLESTEROL, TOTAL: 189 MG/DL
CREATININE: 0.85 MG/DL (ref 0.5–1.05)
EGFR: 75 ML/MIN/1.73M2
EOSINOPHILS: 4.2 %
GLOBULIN: 2.1 G/DL (CALC) (ref 1.9–3.7)
GLUCOSE: 86 MG/DL (ref 65–99)
HDL CHOLESTEROL: 60 MG/DL
HEMATOCRIT: 41.8 % (ref 35–45)
HEMOGLOBIN A1C: 5.2 % OF TOTAL HGB
HEMOGLOBIN: 14 G/DL (ref 11.7–15.5)
LDL-CHOLESTEROL: 106 MG/DL (CALC)
LYMPHOCYTES: 30.7 %
MCH: 32.2 PG (ref 27–33)
MCHC: 33.5 G/DL (ref 32–36)
MCV: 96.1 FL (ref 80–100)
MONOCYTES: 7.6 %
MPV: 10.3 FL (ref 7.5–12.5)
NEUTROPHILS: 56.3 %
NON-HDL CHOLESTEROL: 129 MG/DL (CALC)
PLATELET COUNT: 226 THOUSAND/UL (ref 140–400)
POTASSIUM: 4.5 MMOL/L (ref 3.5–5.3)
PROTEIN, TOTAL: 6.4 G/DL (ref 6.1–8.1)
RDW: 12.1 % (ref 11–15)
RED BLOOD CELL COUNT: 4.35 MILLION/UL (ref 3.8–5.1)
SODIUM: 143 MMOL/L (ref 135–146)
TRIGLYCERIDES: 130 MG/DL
TSH W/REFLEX TO FT4: 1.95 MIU/L (ref 0.4–4.5)
WHITE BLOOD CELL COUNT: 5.8 THOUSAND/UL (ref 3.8–10.8)

## 2023-12-06 ENCOUNTER — HOSPITAL ENCOUNTER (OUTPATIENT)
Dept: MAMMOGRAPHY | Age: 68
Discharge: HOME | End: 2023-12-06
Attending: FAMILY MEDICINE
Payer: MEDICARE

## 2023-12-06 DIAGNOSIS — Z12.31 ENCOUNTER FOR SCREENING MAMMOGRAM FOR MALIGNANT NEOPLASM OF BREAST: ICD-10-CM

## 2023-12-06 PROCEDURE — 77067 SCR MAMMO BI INCL CAD: CPT | Performed by: FAMILY MEDICINE

## 2023-12-06 PROCEDURE — 77063 BREAST TOMOSYNTHESIS BI: CPT | Performed by: FAMILY MEDICINE

## 2023-12-19 ENCOUNTER — TELEPHONE (OUTPATIENT)
Dept: FAMILY MEDICINE CLINIC | Facility: CLINIC | Age: 68
End: 2023-12-19

## 2023-12-19 RX ORDER — AMOXICILLIN AND CLAVULANATE POTASSIUM 875; 125 MG/1; MG/1
1 TABLET, FILM COATED ORAL 2 TIMES DAILY
Qty: 20 TABLET | Refills: 0 | Status: SHIPPED | OUTPATIENT
Start: 2023-12-19

## 2023-12-19 RX ORDER — PREDNISONE 20 MG/1
TABLET ORAL
Qty: 10 TABLET | Refills: 0 | Status: SHIPPED | OUTPATIENT
Start: 2023-12-19

## 2023-12-19 NOTE — TELEPHONE ENCOUNTER
For about 3 weeks patient has had sinus congestion and pain, clogged ears. She has had 2 episodes of a sharp pain in her left ear. No drainage from ears. She has been using her netti pot and all allergy medications. She is thinking she needs antibiotic and steroids. Please advise.

## 2023-12-19 NOTE — TELEPHONE ENCOUNTER
Will send Rx for Augmentin 875 mg one bid for 10 days and Prednisone 20 mg 2 tablets once daily with lunch for 5 days to her pharmacy. If her symptoms worsen despite the prescriptions, she should be seen at the [address] [place] [address].

## 2023-12-19 NOTE — TELEPHONE ENCOUNTER
URGENT SICK CALL    Yessi RITESH Spence  LOV: 10/25/2023     Patient tested negative for Covid, experiencing congestion ,lots of mucus, black ears, tear ducks. States that is has been going for the past weeks its only getting worse. Patient is leaving on Tuesday to [place] states she will be driving there and would like to see provider, to get a medication prescribed. Please advise.

## 2023-12-21 ENCOUNTER — TELEPHONE (OUTPATIENT)
Dept: FAMILY MEDICINE CLINIC | Facility: CLINIC | Age: 68
End: 2023-12-21

## 2023-12-21 NOTE — TELEPHONE ENCOUNTER
Rest/Stress SPECT Myocardial Perfusion Imaging with Exercise Stress-[facility] 12/15/2023    Stress testing impression:  Stress EKG is normal.    Nuclear impression:  1. This is a normal perfusion study, no perfusion defects noted. 2.  The left ventricular cavity is noted to be normal on the stress study.   The left ventricular ejection fraction was calculated to be 64% and the left ventricular global function is normal.

## 2023-12-29 ENCOUNTER — MED REC SCAN ONLY (OUTPATIENT)
Dept: FAMILY MEDICINE CLINIC | Facility: CLINIC | Age: 68
End: 2023-12-29

## 2024-04-16 ENCOUNTER — OFFICE VISIT (OUTPATIENT)
Dept: FAMILY MEDICINE CLINIC | Facility: CLINIC | Age: 69
End: 2024-04-16
Payer: MEDICARE

## 2024-04-16 VITALS
HEART RATE: 71 BPM | BODY MASS INDEX: 31 KG/M2 | DIASTOLIC BLOOD PRESSURE: 72 MMHG | HEIGHT: 64 IN | RESPIRATION RATE: 18 BRPM | TEMPERATURE: 98 F | OXYGEN SATURATION: 98 % | SYSTOLIC BLOOD PRESSURE: 104 MMHG

## 2024-04-16 DIAGNOSIS — J30.89 ALLERGIC RHINITIS DUE TO DUST MITE: ICD-10-CM

## 2024-04-16 DIAGNOSIS — R35.0 URINARY FREQUENCY: Primary | ICD-10-CM

## 2024-04-16 DIAGNOSIS — J01.00 ACUTE NON-RECURRENT MAXILLARY SINUSITIS: ICD-10-CM

## 2024-04-16 DIAGNOSIS — E78.5 DYSLIPIDEMIA: ICD-10-CM

## 2024-04-16 LAB
APPEARANCE: CLEAR
BILIRUBIN: NEGATIVE
GLUCOSE (URINE DIPSTICK): NEGATIVE MG/DL
KETONES (URINE DIPSTICK): NEGATIVE MG/DL
LEUKOCYTES: NEGATIVE
MULTISTIX LOT#: ABNORMAL NUMERIC
NITRITE, URINE: NEGATIVE
PH, URINE: 6 (ref 4.5–8)
PROTEIN (URINE DIPSTICK): NEGATIVE MG/DL
SPECIFIC GRAVITY: 1.01 (ref 1–1.03)
URINE-COLOR: YELLOW
UROBILINOGEN,SEMI-QN: 0.2 MG/DL (ref 0–1.9)

## 2024-04-16 PROCEDURE — 96127 BRIEF EMOTIONAL/BEHAV ASSMT: CPT | Performed by: FAMILY MEDICINE

## 2024-04-16 PROCEDURE — 87086 URINE CULTURE/COLONY COUNT: CPT | Performed by: FAMILY MEDICINE

## 2024-04-16 PROCEDURE — G2211 COMPLEX E/M VISIT ADD ON: HCPCS | Performed by: FAMILY MEDICINE

## 2024-04-16 PROCEDURE — 99214 OFFICE O/P EST MOD 30 MIN: CPT | Performed by: FAMILY MEDICINE

## 2024-04-16 PROCEDURE — 81003 URINALYSIS AUTO W/O SCOPE: CPT | Performed by: FAMILY MEDICINE

## 2024-04-16 RX ORDER — ATORVASTATIN CALCIUM 10 MG/1
10 TABLET, FILM COATED ORAL NIGHTLY
Qty: 90 TABLET | Refills: 3 | Status: SHIPPED | OUTPATIENT
Start: 2024-04-16

## 2024-04-16 RX ORDER — MONTELUKAST SODIUM 10 MG/1
10 TABLET ORAL NIGHTLY
Qty: 90 TABLET | Refills: 3 | Status: SHIPPED | OUTPATIENT
Start: 2024-04-16

## 2024-04-16 RX ORDER — CEFUROXIME AXETIL 250 MG/1
250 TABLET ORAL 2 TIMES DAILY
Qty: 14 TABLET | Refills: 0 | Status: SHIPPED | OUTPATIENT
Start: 2024-04-16

## 2024-04-16 NOTE — PATIENT INSTRUCTIONS
Take the Ceftin 250 mg one tablet with breakfast and dinner for 7 days. While on the antibiotics, eat yogurt or take a probiotic to help replenish the good bacteria in your intestines.    Continue your medications as prescribed.    Use a HEPA filter in your bedroom and run it 24/7 to help decrease your allergy symptoms.    Let me know if your urinary symptoms do not improve.    See me in September for your annual Medicare physical.

## 2024-04-16 NOTE — PROGRESS NOTES
The 21st Century Cures Act makes medical notes like these available to patients in the interest of transparency. Please be advised this is a medical document. Medical documents are intended to carry relevant information, facts as evident, and the clinical opinion of the practitioner. The medical note is intended as peer to peer communication and may appear blunt or direct. It is written in medical language and may contain abbreviations or verbiage that are unfamiliar.       Yessi Spence is a 68 year old female.    HPI:     Chief Complaint   Patient presents with    Follow - Up    UTI       This 68-year-old female presents to the office for several concerns today.    1.  The patient has noted increasing urinary frequency for the last 2 weeks.  This is associated with urinary urgency but no dysuria, hematuria, fever, chills, abdominal pain or flank pain.  She has not been taking anything for her symptoms.  She is concerned about possible urinary tract infection.    2.  The patient has history for allergies and chronic sinus problems.  She states she was treated for a sinus infection with Augmentin and prednisone while she was in [place] at the end of March.  She needs a refill on her montelukast.  She is taking her Allegra and Flonase for her allergy symptoms. The Azelastine did not seem to help. She is concerned that the sinus infection has not cleared.  She continues to have sinus pressure, ear pressure and she is coughing yellow mucus.  She is not having any shortness of breath, fever or chills.    3.  The patient needs a refill on her atorvastatin for her dyslipidemia.    HISTORY:  Past Medical History:    Allergic rhinitis    Cancer (HCC)    squamous and basal cell ca    Cataract    ANGIE II (cervical intraepithelial neoplasia II)    COLPO    Diverticulosis of large intestine    Eye disease    Flatulence/gas pain/belching    Hemorrhoids    HGSIL (high grade squamous intraepithelial dysplasia)    Hyperlipidemia     Macular degeneration    Osteoarthritis    Sinusitis    Smoker    Varicose vein of leg    Visual impairment    glasses      Past Surgical History:   Procedure Laterality Date    Appendectomy      Colonoscopy      Colonoscopy N/A 4/28/2023    Procedure: COLONOSCOPY with cold snare polypectomy;  Surgeon: Johnathan Porter DO;  Location:  ENDOSCOPY    Colposcopy, cervix w/upper adjacent vagina; w/endocervical curettage  10/2014    ANGIE 2    Leep  11/2014    Nasal surg proc unlisted  1992    deviated septum    Other  2007    nasal polypectomy    Other surgical history Left 2009    arm, squamos cell carcinoma removal    Other surgical history  2022    squamous cell surgery      Family History   Problem Relation Age of Onset    Thyroid Disorder Mother     Heart Disease Mother     Lipids Mother     Breast Cancer Mother 46    Heart Disease Father     Cancer Father         liver    Hypertension Father     Breast Cancer Maternal Aunt 55    Breast Cancer Paternal Aunt 60    Breast Cancer Paternal Cousin Male 62    Breast Cancer Paternal Aunt 60    Breast Cancer Paternal Cousin Male 60    Ear Problems Neg     Bleeding Disorders Neg     Clotting Disorder Neg     Anesthesia Problems  Neg       Social History:   Social History     Socioeconomic History    Marital status:    Occupational History    Occupation: retired    Tobacco Use    Smoking status: Some Days     Current packs/day: 0.50     Average packs/day: 0.5 packs/day for 35.0 years (17.5 ttl pk-yrs)     Types: Cigarettes    Smokeless tobacco: Never    Tobacco comments:     pt did quit with chantix however has had ciggs here and there   Vaping Use    Vaping status: Never Used   Substance and Sexual Activity    Alcohol use: Yes     Alcohol/week: 2.0 - 3.0 standard drinks of alcohol     Types: 2 - 3 Glasses of wine per week     Comment: 1-2 a week, perhaps    Drug use: No    Sexual activity: Not Currently     Partners: Male   Other Topics Concern     Caffeine Concern No    Exercise Yes    Seat Belt Yes    Special Diet No    Stress Concern No    Weight Concern No        Medications (Active prior to today's visit):  Current Outpatient Medications   Medication Sig Dispense Refill    montelukast 10 MG Oral Tab Take 1 tablet (10 mg total) by mouth nightly. 90 tablet 3    atorvastatin 10 MG Oral Tab Take 1 tablet (10 mg total) by mouth nightly. 90 tablet 3    cefuroxime 250 MG Oral Tab Take 1 tablet (250 mg total) by mouth 2 (two) times daily. 14 tablet 0    azelastine 0.1 % Nasal Solution 2 sprays by Nasal route 2 (two) times daily. 30 mL 5    guaiFENesin (MUCINEX OR) Take by mouth.      ZINC OR Use as directed in the mouth or throat.      Fexofenadine HCl (ALLEGRA OR)       Multiple Vitamins-Minerals (OCUVITE ADULT FORMULA OR) Take by mouth.         Allergies:  Allergies   Allergen Reactions    Other UNKNOWN     vinegar    Tetracycline     Mold FATIGUE     Hives fever fatigue       ROS:     Pertinent positives and pertinent negatives are as listed in HPI.    All other review of symptoms were reviewed and negative.    PHYSICAL EXAM:   /72 (BP Location: Left arm, Patient Position: Sitting, Cuff Size: adult)   Pulse 71   Temp 97.6 °F (36.4 °C)   Resp 18   Ht 5' 4\" (1.626 m)   LMP  (LMP Unknown)   SpO2 98%   BMI 30.73 kg/m²     Wt Readings from Last 3 Encounters:   09/20/23 179 lb (81.2 kg)   04/28/23 168 lb (76.2 kg)   04/25/23 170 lb (77.1 kg)       BP Readings from Last 3 Encounters:   04/16/24 104/72   10/25/23 102/60   09/20/23 104/60       General: Obese, well hydrated. No acute distress. No pallor.   HEENT: Normocephalic, atraumatic.  EDWARD, EOMI, Sclera clear and non icteric bilaterally. TM's normal, nose with marked congestion, tender over the maxillary sinuses, pharynx without redness or exudates. Post nasal drip is noted. Moist mucous membranes.  Neck: Supple. No lymphadenopathy. No thyromegaly. No bruits noted.  Heart: RRR without S3 or S4 or  murmur.  Lungs: Clear to auscultation bilaterally. No rales, rhonchi or wheezes. No tachypnea or retractions noted.  Abdomen: Soft, nontender, nondistended, normal bowel sounds. No organomegaly. No guarding, rigidity or rebound noted.  Extremities: No edema bilaterally.  Skin: Warm and dry.  Neuro: Alert and oriented x 3, normal gait.  Psych: Normal mood and affect.     ASSESSMENT/PLAN:   68 year old female with    LABS This Visit:    Results for orders placed or performed in visit on 04/16/24   Urine Dip, auto without Micro    Collection Time: 04/16/24  9:19 AM   Result Value Ref Range    Glucose Urine Negative Negative mg/dL    Bilirubin Urine Negative Negative    Ketones, UA Negative Negative - Trace mg/dL    Spec Gravity 1.010 1.005 - 1.030    Blood Urine Trace-intact (A) Negative    PH Urine 6.0 5.0 - 8.0    Protein Urine Negative Negative - Trace mg/dL    Urobilinogen Urine 0.2 0.2 - 1.0 mg/dL    Nitrite Urine Negative Negative    Leukocyte Esterase Urine Negative Negative    APPEARANCE clear Clear    Color Urine yellow Yellow    Multistix Lot# [id number] Numeric    Multistix Expiration Date 04/30/2025 Date        1. Urinary frequency    I discussed the results of the urinalysis with the patient.  Urine culture is sent.  I informed the patient that at this time, her lab is not conclusive for urinary tract infection.  If her symptoms do not improve, she needs to contact the office and we would discuss medication for overactive bladder.    - Urine Dip, auto without Micro  - Urine Culture, Routine [E]    2. Acute non-recurrent maxillary sinusitis    I will treat with Ceftin 250 mg twice daily for sinusitis.    - cefuroxime 250 MG Oral Tab; Take 1 tablet (250 mg total) by mouth 2 (two) times daily.  Dispense: 14 tablet; Refill: 0    3. Allergic rhinitis due to dust mite    I refilled her montelukast.  She should continue with the Allegra and the Flonase.  I encouraged the patient to get a HEPA filter and run it in  her bedroom while she is sleeping.  The patient had seen an allergist about 10 years ago and does not feel she needs to see one now.    - montelukast 10 MG Oral Tab; Take 1 tablet (10 mg total) by mouth nightly.  Dispense: 90 tablet; Refill: 3    4. Dyslipidemia    Cholesterol:     Lab Results   Component Value Date    CHOLEST 189 10/25/2023    CHOLEST 211 (H) 10/26/2022    CHOLEST 202 (H) 10/25/2021     Lab Results   Component Value Date    HDL 60 10/25/2023    HDL 62 10/26/2022    HDL 65 10/25/2021     Lab Results   Component Value Date    TRIG 130 10/25/2023    TRIG 152 (H) 10/26/2022    TRIG 127 10/25/2021     Lab Results   Component Value Date     (H) 10/25/2023     (H) 10/26/2022     (H) 10/25/2021     Lab Results   Component Value Date    AST 18 10/25/2023    AST 18 10/26/2022    AST 19 10/25/2021     Lab Results   Component Value Date    ALT 17 10/25/2023    ALT 20 10/26/2022    ALT 20 10/25/2021      I refilled the patient's atorvastatin.    - atorvastatin 10 MG Oral Tab; Take 1 tablet (10 mg total) by mouth nightly.  Dispense: 90 tablet; Refill: 3     5. Health maintenance    Patient is to return in September for her annual Medicare wellness exam.      Health Maintenance:    Health Maintenance   Topic Date Due    Tobacco Cessation Counseling 1 Year  09/12/2023    COVID-19 Vaccine (7 - 2023-24 season) 12/04/2023    [place] Annual Health Assessment  01/01/2024    Annual Depression Screening  01/01/2024    Fall Risk Screening (Annual)  01/01/2024    Mammogram  12/06/2024    Pap Smear  10/09/2026    Colorectal Cancer Screening  04/28/2030    Influenza Vaccine  Completed    DEXA Scan  Completed    Pneumococcal Vaccine: 65+ Years  Completed    Zoster Vaccines  Completed         Meds This Visit:  Requested Prescriptions     Signed Prescriptions Disp Refills    montelukast 10 MG Oral Tab 90 tablet 3     Sig: Take 1 tablet (10 mg total) by mouth nightly.    atorvastatin 10 MG Oral Tab 90 tablet 3      Sig: Take 1 tablet (10 mg total) by mouth nightly.    cefuroxime 250 MG Oral Tab 14 tablet 0     Sig: Take 1 tablet (250 mg total) by mouth 2 (two) times daily.       Imaging & Referrals:  None     Patient understands plan and follow-up.  FU in 9/2024 for Medicare wellness exam or sooner for worsening symptoms.    4/16/2024  Mely Amaral DO    Total time: 30  minutes including precharting, H&P, plan of care    This dictation was performed with a verbal recognition program (DRAGON) and it was checked for errors. It is possible that there are still dictated errors within this office note. If so, please bring any errors to my attention for an addendum. All efforts were made to ensure that this office note is accurate     Awake/Alert

## 2024-06-11 ENCOUNTER — OFFICE VISIT (OUTPATIENT)
Dept: FAMILY MEDICINE CLINIC | Facility: CLINIC | Age: 69
End: 2024-06-11
Payer: MEDICARE

## 2024-06-11 VITALS
BODY MASS INDEX: 31 KG/M2 | HEART RATE: 76 BPM | HEIGHT: 64 IN | DIASTOLIC BLOOD PRESSURE: 76 MMHG | TEMPERATURE: 98 F | SYSTOLIC BLOOD PRESSURE: 126 MMHG | RESPIRATION RATE: 18 BRPM

## 2024-06-11 DIAGNOSIS — J01.00 ACUTE NON-RECURRENT MAXILLARY SINUSITIS: Primary | ICD-10-CM

## 2024-06-11 DIAGNOSIS — H69.93 EUSTACHIAN TUBE DYSFUNCTION, BILATERAL: ICD-10-CM

## 2024-06-11 PROCEDURE — 99213 OFFICE O/P EST LOW 20 MIN: CPT | Performed by: FAMILY MEDICINE

## 2024-06-11 PROCEDURE — G2211 COMPLEX E/M VISIT ADD ON: HCPCS | Performed by: FAMILY MEDICINE

## 2024-06-11 RX ORDER — AMOXICILLIN AND CLAVULANATE POTASSIUM 875; 125 MG/1; MG/1
1 TABLET, FILM COATED ORAL 2 TIMES DAILY
Qty: 20 TABLET | Refills: 0 | Status: SHIPPED | OUTPATIENT
Start: 2024-06-11

## 2024-06-11 RX ORDER — AZELASTINE 1 MG/ML
SPRAY, METERED NASAL 2 TIMES DAILY
COMMUNITY

## 2024-06-11 RX ORDER — PREDNISONE 20 MG/1
TABLET ORAL
Qty: 14 TABLET | Refills: 0 | Status: SHIPPED | OUTPATIENT
Start: 2024-06-11

## 2024-06-11 NOTE — PATIENT INSTRUCTIONS
Take the Augmentin 875 mg one tablet with breakfast and dinner for 10 days. While on the antibiotics, eat yogurt or take a probiotic to help replenish the good bacteria in your intestines.    Take the Prednisone 20 mg 2 tablets once daily with lunch for 7 days. Take your prednisone with a full meal and early in the day.  Do not take any Ibuprofen, Advil, Motrin, Naproxen, Aspirin while on Prednisone. Tylenol is OK for fever or pain.    Continue the astepro nasal spray.    Continue your sinus rinses.    Stay well hydrated.    Follow up if your symptoms do not improve.

## 2024-06-11 NOTE — PROGRESS NOTES
The 21st Century Cures Act makes medical notes like these available to patients in the interest of transparency. Please be advised this is a medical document. Medical documents are intended to carry relevant information, facts as evident, and the clinical opinion of the practitioner. The medical note is intended as peer to peer communication and may appear blunt or direct. It is written in medical language and may contain abbreviations or verbiage that are unfamiliar.       Yessi Spence is a 68 year old female.    HPI:     Chief Complaint   Patient presents with    Sinus Problem     X3 weeks       This 68-year-old female presents to the office with 3-week history of worsening sinus congestion, nasal stuffiness and bilateral ear pressure.  She states she has had intermittent dizzinessccasional pain to the right ear.  She denies any associated fever or chills.  She admits to a lot of postnasal drip.  She has been doing sinus rinses on a daily basis and using her Astepro nasal spray.  She is supposed to be flying in July and is concerned about her ears.      HISTORY:  Past Medical History:    Allergic rhinitis    Cancer (HCC)    squamous and basal cell ca    Cataract    ANGIE II (cervical intraepithelial neoplasia II)    COLPO    Diverticulosis of large intestine    Eye disease    Flatulence/gas pain/belching    Hemorrhoids    HGSIL (high grade squamous intraepithelial dysplasia)    Hyperlipidemia    Macular degeneration    Osteoarthritis    Sinusitis    Smoker    Varicose vein of leg    Visual impairment    glasses      Past Surgical History:   Procedure Laterality Date    Appendectomy      Colonoscopy      Colonoscopy N/A 4/28/2023    Procedure: COLONOSCOPY with cold snare polypectomy;  Surgeon: Johnathan Porter DO;  Location:  ENDOSCOPY    Colposcopy, cervix w/upper adjacent vagina; w/endocervical curettage  10/2014    ANGIE 2    Leep  11/2014    Nasal surg proc unlisted  1992    deviated septum    Other  2007     nasal polypectomy    Other surgical history Left 2009    arm, squamos cell carcinoma removal    Other surgical history  2022    squamous cell surgery      Family History   Problem Relation Age of Onset    Thyroid Disorder Mother     Heart Disease Mother     Lipids Mother     Breast Cancer Mother 46    Heart Disease Father     Cancer Father         liver    Hypertension Father     Breast Cancer Maternal Aunt 55    Breast Cancer Paternal Aunt 60    Breast Cancer Paternal Cousin Male 62    Breast Cancer Paternal Aunt 60    Breast Cancer Paternal Cousin Male 60    Ear Problems Neg     Bleeding Disorders Neg     Clotting Disorder Neg     Anesthesia Problems  Neg       Social History:   Social History     Socioeconomic History    Marital status:    Occupational History    Occupation: retired    Tobacco Use    Smoking status: Some Days     Current packs/day: 0.50     Average packs/day: 0.5 packs/day for 35.0 years (17.5 ttl pk-yrs)     Types: Cigarettes    Smokeless tobacco: Never    Tobacco comments:     pt did quit with chantix however has had ciggs here and there   Vaping Use    Vaping status: Never Used   Substance and Sexual Activity    Alcohol use: Yes     Alcohol/week: 2.0 - 3.0 standard drinks of alcohol     Types: 2 - 3 Glasses of wine per week     Comment: 1-2 a week, perhaps    Drug use: No    Sexual activity: Not Currently     Partners: Male   Other Topics Concern    Caffeine Concern No    Exercise Yes    Seat Belt Yes    Special Diet No    Stress Concern No    Weight Concern No        Medications (Active prior to today's visit):  Current Outpatient Medications   Medication Sig Dispense Refill    azelastine 0.1 % Nasal Solution by Nasal route 2 (two) times daily.      amoxicillin clavulanate 875-125 MG Oral Tab Take 1 tablet by mouth 2 (two) times daily. 20 tablet 0    predniSONE 20 MG Oral Tab Take 2 tablets once daily with breakfast for 7 days. 14 tablet 0    montelukast 10 MG Oral Tab  Take 1 tablet (10 mg total) by mouth nightly. 90 tablet 3    atorvastatin 10 MG Oral Tab Take 1 tablet (10 mg total) by mouth nightly. 90 tablet 3    guaiFENesin (MUCINEX OR) Take by mouth.      ZINC OR Use as directed in the mouth or throat.      Fexofenadine HCl (ALLEGRA OR)       Multiple Vitamins-Minerals (OCUVITE ADULT FORMULA OR) Take by mouth.         Allergies:  Allergies   Allergen Reactions    Other UNKNOWN     vinegar    Tetracycline     Mold FATIGUE     Hives fever fatigue       ROS:     Pertinent positives and pertinent negatives are as listed in HPI.    All other review of symptoms were reviewed and negative.    PHYSICAL EXAM:   /76 (BP Location: Left arm, Patient Position: Sitting, Cuff Size: adult)   Pulse 76   Temp 98.3 °F (36.8 °C)   Resp 18   Ht 5' 4\" (1.626 m)   LMP  (LMP Unknown)   BMI 30.73 kg/m²     Wt Readings from Last 3 Encounters:   09/20/23 179 lb (81.2 kg)   04/28/23 168 lb (76.2 kg)   04/25/23 170 lb (77.1 kg)       BP Readings from Last 3 Encounters:   06/11/24 126/76   04/16/24 104/72   10/25/23 102/60       General: Obese, well hydrated. No acute distress. No pallor.   HEENT: Normocephalic, atraumatic.  EDWARD, EOMI, Sclera clear and non icteric bilaterally. TM's normal color, both are retracted, no fluid is noted, bilateral EAC's normal, nose with mild congestion, pharynx without redness or exudates. Post nasal drip is noted. Moist mucous membranes.  Neck: Supple. No lymphadenopathy. No thyromegaly. No bruits noted.  Heart: RRR without S3 or S4 or murmur.  Lungs: Clear to auscultation bilaterally. No rales, rhonchi or wheezes. No tachypnea or retractions noted.  Extremities: No edema bilaterally.  Skin: Warm and dry.  Neuro: Alert and oriented x 3, normal gait.  Psych: Normal mood and affect.     ASSESSMENT/PLAN:   68 year old female with    LABS This Visit:    Results for orders placed or performed in visit on 04/16/24   Urine Dip, auto without Micro    Collection Time:  04/16/24  9:19 AM   Result Value Ref Range    Glucose Urine Negative Negative mg/dL    Bilirubin Urine Negative Negative    Ketones, UA Negative Negative - Trace mg/dL    Spec Gravity 1.010 1.005 - 1.030    Blood Urine Trace-intact (A) Negative    PH Urine 6.0 5.0 - 8.0    Protein Urine Negative Negative - Trace mg/dL    Urobilinogen Urine 0.2 0.2 - 1.0 mg/dL    Nitrite Urine Negative Negative    Leukocyte Esterase Urine Negative Negative    APPEARANCE clear Clear    Color Urine yellow Yellow    Multistix Lot# [id number] Numeric    Multistix Expiration Date 04/30/2025 Date   Urine Culture, Routine [E]    Collection Time: 04/16/24  9:41 AM    Specimen: Urine, clean catch   Result Value Ref Range    Urine Culture No Growth at 18-24 hrs.         1. Acute non-recurrent maxillary sinusitis  2. Eustachian tube dysfunction, bilateral    I discussed eustachian tube dysfunction with the patient.  I suspect she has a sinus infection as well.  She should continue with her sinus rinses and her Astepro nasal spray.  I will treat her with Augmentin 875 mg twice daily for 10 days and prednisone 20 mg 2 tablets once daily with lunch for 1 week with food.  Usage and side effects of these medications are reviewed.  The patient should follow-up if her symptoms do not improve.    - amoxicillin clavulanate 875-125 MG Oral Tab; Take 1 tablet by mouth 2 (two) times daily.  Dispense: 20 tablet; Refill: 0  - predniSONE 20 MG Oral Tab; Take 2 tablets once daily with breakfast for 7 days.  Dispense: 14 tablet; Refill: 0          Other orders  - azelastine 0.1 % Nasal Solution; by Nasal route 2 (two) times daily.         Health Maintenance:    Health Maintenance   Topic Date Due    COVID-19 Vaccine (7 - 2023-24 season) 12/04/2023    MA Annual Health Assessment  01/01/2024    Tobacco Cessation Counseling  01/01/2024    Mammogram  12/06/2024    Pap Smear  10/09/2026    Colorectal Cancer Screening  04/28/2030    Influenza Vaccine  Completed    DEXA  Scan  Completed    Annual Depression Screening  Completed    Fall Risk Screening (Annual)  Completed    Pneumococcal Vaccine: 65+ Years  Completed    Zoster Vaccines  Completed         Meds This Visit:  Requested Prescriptions     Signed Prescriptions Disp Refills    amoxicillin clavulanate 875-125 MG Oral Tab 20 tablet 0     Sig: Take 1 tablet by mouth 2 (two) times daily.    predniSONE 20 MG Oral Tab 14 tablet 0     Sig: Take 2 tablets once daily with breakfast for 7 days.       Imaging & Referrals:  None     Patient understands plan and follow-up.  Follow up if symptoms do not improve.    6/11/2024  Mely Amaral DO    Total time: 20 minutes including precharting, H&P, plan of care    This dictation was performed with a verbal recognition program (DRAGON) and it was checked for errors. It is possible that there are still dictated errors within this office note. If so, please bring any errors to my attention for an addendum. All efforts were made to ensure that this office note is accurate

## 2024-06-25 ENCOUNTER — TELEPHONE (OUTPATIENT)
Dept: FAMILY MEDICINE CLINIC | Facility: CLINIC | Age: 69
End: 2024-06-25

## 2024-06-25 NOTE — TELEPHONE ENCOUNTER
Patient calling and saw Dr. Mely Amaral 6/11/24 for ear issue, she is still feeling off/dizzy and Dr. Mely Amaral does not have any appts until July 9th. She is leaving for [place] July 6th and would like to either see doctor or have her prescribe a new rx    Please advise

## 2024-06-25 NOTE — TELEPHONE ENCOUNTER
Phoned patient. Was able to schedule her 6/27 in a cancellation. Patient verbalized agreement and understanding.

## 2024-06-27 ENCOUNTER — OFFICE VISIT (OUTPATIENT)
Dept: FAMILY MEDICINE CLINIC | Facility: CLINIC | Age: 69
End: 2024-06-27

## 2024-06-27 VITALS
SYSTOLIC BLOOD PRESSURE: 132 MMHG | HEART RATE: 74 BPM | RESPIRATION RATE: 16 BRPM | TEMPERATURE: 98 F | HEIGHT: 64 IN | DIASTOLIC BLOOD PRESSURE: 80 MMHG | OXYGEN SATURATION: 98 % | BODY MASS INDEX: 31 KG/M2

## 2024-06-27 DIAGNOSIS — H69.93 EUSTACHIAN TUBE DYSFUNCTION, BILATERAL: Primary | ICD-10-CM

## 2024-06-27 DIAGNOSIS — R09.81 SINUS CONGESTION: ICD-10-CM

## 2024-06-27 DIAGNOSIS — J30.89 NON-SEASONAL ALLERGIC RHINITIS, UNSPECIFIED TRIGGER: ICD-10-CM

## 2024-06-27 PROCEDURE — 99213 OFFICE O/P EST LOW 20 MIN: CPT | Performed by: FAMILY MEDICINE

## 2024-06-27 PROCEDURE — G2211 COMPLEX E/M VISIT ADD ON: HCPCS | Performed by: FAMILY MEDICINE

## 2024-06-27 RX ORDER — PREDNISONE 20 MG/1
TABLET ORAL
Qty: 10 TABLET | Refills: 0 | Status: SHIPPED | OUTPATIENT
Start: 2024-06-27

## 2024-06-27 NOTE — PROGRESS NOTES
The 21st Century Cures Act makes medical notes like these available to patients in the interest of transparency. Please be advised this is a medical document. Medical documents are intended to carry relevant information, facts as evident, and the clinical opinion of the practitioner. The medical note is intended as peer to peer communication and may appear blunt or direct. It is written in medical language and may contain abbreviations or verbiage that are unfamiliar.       Yessi Spence is a 68 year old female.    HPI:     Chief Complaint   Patient presents with    Ear Problem     Right        This 68 year old female presents for follow up on her sinus congestion and right ear discomfort. I had last seen the patient on 6/11/2024 and she was treated for sinusitis and eustachian tube dysfunction with Augmentin and a 5-day course of prednisone 40 mg daily.  She is using her Astepro nasal spray twice a day and doing daily sinus rinses.  She does have allergies for which she takes montelukast 10 mg daily and Allegra 180 daily.  The patient states her symptoms improved but they have not resolved.  She still is having difficulty with discomfort to the right ear and some decreased hearing in the right ear.  She still feels like there is some swelling along the path of the eustachian tube.  She denies any fever or chills or ear discharge.  She is concerned because she supposed to be flying to [facility] on July 6.  She wanted to make sure she did not have an ear infection before traveling.  She has not been to an ear, nose and throat physician for at least 7 years.    HISTORY:  Past Medical History:    Allergic rhinitis    Cancer (HCC)    squamous and basal cell ca    Cataract    ANGIE II (cervical intraepithelial neoplasia II)    COLPO    Diverticulosis of large intestine    Eye disease    Flatulence/gas pain/belching    Hemorrhoids    HGSIL (high grade squamous intraepithelial dysplasia)    Hyperlipidemia    Macular  degeneration    Osteoarthritis    Sinusitis    Smoker    Varicose vein of leg    Visual impairment    glasses      Past Surgical History:   Procedure Laterality Date    Appendectomy      Colonoscopy      Colonoscopy N/A 4/28/2023    Procedure: COLONOSCOPY with cold snare polypectomy;  Surgeon: Johnathan Porter DO;  Location:  ENDOSCOPY    Colposcopy, cervix w/upper adjacent vagina; w/endocervical curettage  10/2014    ANGIE 2    Leep  11/2014    Nasal surg proc unlisted  1992    deviated septum    Other  2007    nasal polypectomy    Other surgical history Left 2009    arm, squamos cell carcinoma removal    Other surgical history  2022    squamous cell surgery      Family History   Problem Relation Age of Onset    Thyroid Disorder Mother     Heart Disease Mother     Lipids Mother     Breast Cancer Mother 46    Heart Disease Father     Cancer Father         liver    Hypertension Father     Breast Cancer Maternal Aunt 55    Breast Cancer Paternal Aunt 60    Breast Cancer Paternal Cousin Male 62    Breast Cancer Paternal Aunt 60    Breast Cancer Paternal Cousin Male 60    Ear Problems Neg     Bleeding Disorders Neg     Clotting Disorder Neg     Anesthesia Problems  Neg       Social History:   Social History     Socioeconomic History    Marital status:    Occupational History    Occupation: retired    Tobacco Use    Smoking status: Some Days     Current packs/day: 0.50     Average packs/day: 0.5 packs/day for 35.0 years (17.5 ttl pk-yrs)     Types: Cigarettes    Smokeless tobacco: Never    Tobacco comments:     pt did quit with chantix however has had ciggs here and there   Vaping Use    Vaping status: Never Used   Substance and Sexual Activity    Alcohol use: Yes     Alcohol/week: 2.0 - 3.0 standard drinks of alcohol     Types: 2 - 3 Glasses of wine per week     Comment: 1-2 a week, perhaps    Drug use: No    Sexual activity: Not Currently     Partners: Male   Other Topics Concern    Caffeine  Concern No    Exercise Yes    Seat Belt Yes    Special Diet No    Stress Concern No    Weight Concern No        Medications (Active prior to today's visit):  Current Outpatient Medications   Medication Sig Dispense Refill    predniSONE 20 MG Oral Tab Take 2 tablets once daily with breakfast for 5 days. 10 tablet 0    azelastine 0.1 % Nasal Solution by Nasal route 2 (two) times daily.      montelukast 10 MG Oral Tab Take 1 tablet (10 mg total) by mouth nightly. 90 tablet 3    atorvastatin 10 MG Oral Tab Take 1 tablet (10 mg total) by mouth nightly. 90 tablet 3    guaiFENesin (MUCINEX OR) Take by mouth.      ZINC OR Use as directed in the mouth or throat.      Fexofenadine HCl (ALLEGRA OR)       Multiple Vitamins-Minerals (OCUVITE ADULT FORMULA OR) Take by mouth.         Allergies:  Allergies   Allergen Reactions    Dust Mites UNKNOWN    Other UNKNOWN     vinegar    Tetracycline     Mold FATIGUE     Hives fever fatigue       ROS:     Pertinent positives and pertinent negatives are as listed in HPI.    All other review of symptoms were reviewed and negative.    PHYSICAL EXAM:   /80 (BP Location: Left arm, Patient Position: Sitting, Cuff Size: adult)   Pulse 74   Temp 98.3 °F (36.8 °C)   Resp 16   Ht 5' 4\" (1.626 m)   LMP  (LMP Unknown)   SpO2 98%   BMI 30.73 kg/m²     Wt Readings from Last 3 Encounters:   09/20/23 179 lb (81.2 kg)   04/28/23 168 lb (76.2 kg)   04/25/23 170 lb (77.1 kg)       BP Readings from Last 3 Encounters:   06/27/24 132/80   06/11/24 126/76   04/16/24 104/72       General: Obese, well hydrated. No acute distress. No pallor.   HEENT: Normocephalic, atraumatic.  EDWARD, EOMI, Sclera clear and non icteric bilaterally. TM's normal color, no fluid is noted, the right TM is retracted, nose with congestion noted, pharynx without redness or exudates. Moist mucous membranes. Post nasal drip is noted.  Neck: Supple. No lymphadenopathy. No thyromegaly. No bruits noted.  Heart: RRR without S3 or  S4 or murmur.  Lungs: Clear to auscultation bilaterally. No rales, rhonchi or wheezes. No tachypnea or retractions noted.  Extremities: No edema bilaterally.  Skin: Warm and dry.  Neuro: Alert and oriented x 3, normal gait.  Psych: Normal mood and affect.     ASSESSMENT/PLAN:   68 year old female with      1. Eustachian tube dysfunction, bilateral  2. Non-seasonal allergic rhinitis, unspecified trigger  3. Sinus congestion    I again discussed eustachian tube dysfunction with the patient.  She has chronic sinus and nasal congestion due to her allergies despite taking her allergy medications.  She will be flying within a week.  I will give her 1 additional course of prednisone 40 mg once daily.  Usage and side effects are reviewed.  The patient is referred to Dr. Joyce of ENT for further evaluation.  She should contact the office if she has any new or worsening symptoms.    - predniSONE 20 MG Oral Tab; Take 2 tablets once daily with breakfast for 5 days.  Dispense: 10 tablet; Refill: 0  - ENT Referral - In Network    4. Health maintenance    Patient is scheduled for her Medicare super visit on 10/2/2024.      Health Maintenance:    Health Maintenance   Topic Date Due    COVID-19 Vaccine (7 - 2023-24 season) 12/04/2023    MA Annual Health Assessment  01/01/2024    Tobacco Cessation Counseling  01/01/2024    Mammogram  12/06/2024    Pap Smear  10/09/2026    Colorectal Cancer Screening  04/28/2030    Influenza Vaccine  Completed    DEXA Scan  Completed    Annual Depression Screening  Completed    Fall Risk Screening (Annual)  Completed    Pneumococcal Vaccine: 65+ Years  Completed    Zoster Vaccines  Completed         Meds This Visit:  Requested Prescriptions     Signed Prescriptions Disp Refills    predniSONE 20 MG Oral Tab 10 tablet 0     Sig: Take 2 tablets once daily with breakfast for 5 days.       Imaging & Referrals:  ENT - INTERNAL     Patient understands plan and follow-up.  Follow up on 10/02/2024 for  Medicare super visit or sooner for any new or worsening symptoms.    6/27/2024  Mely Amaral DO    Total time: 20 minutes including precharting, H&P, plan of care    This dictation was performed with a verbal recognition program (DRAGON) and it was checked for errors. It is possible that there are still dictated errors within this office note. If so, please bring any errors to my attention for an addendum. All efforts were made to ensure that this office note is accurate

## 2024-06-27 NOTE — PATIENT INSTRUCTIONS
Take the Prednisone 20 mg 2 tablets with breakfast for 5 days. Take your prednisone with a full meal and early in the day.  Do not take any Ibuprofen, Advil, Motrin, Naproxen, Aspirin while on Prednisone. Tylenol is OK for fever or pain.    Continue your nasal spray and sinus rinses.    Make an appointment with the ENT doctor, Dr. Joyce or one of his partners.    Contact the office for any worsening symptoms.

## 2024-08-15 ENCOUNTER — OFFICE VISIT (OUTPATIENT)
Facility: LOCATION | Age: 69
End: 2024-08-15
Payer: MEDICARE

## 2024-08-15 DIAGNOSIS — H93.11 RIGHT-SIDED TINNITUS: Primary | ICD-10-CM

## 2024-08-15 DIAGNOSIS — K11.20 PAROTITIS: Primary | ICD-10-CM

## 2024-08-15 DIAGNOSIS — H69.93 DYSFUNCTION OF BOTH EUSTACHIAN TUBES: ICD-10-CM

## 2024-08-15 PROCEDURE — 92567 TYMPANOMETRY: CPT | Performed by: AUDIOLOGIST

## 2024-08-15 PROCEDURE — 92557 COMPREHENSIVE HEARING TEST: CPT | Performed by: AUDIOLOGIST

## 2024-08-15 PROCEDURE — 99203 OFFICE O/P NEW LOW 30 MIN: CPT | Performed by: OTOLARYNGOLOGY

## 2024-08-15 NOTE — PROGRESS NOTES
Yessi Spence is a 69 year old female. No chief complaint on file.    HPI:   She has noticed off-and-on swelling at the right ear.  At times there is some discomfort but not true pain.  She has noticed some fullness in her ears.  She did try some steroids with limited relief.  She is a smoker.  She denies sore throat.  Current Outpatient Medications   Medication Sig Dispense Refill    predniSONE 20 MG Oral Tab Take 2 tablets once daily with breakfast for 5 days. 10 tablet 0    azelastine 0.1 % Nasal Solution by Nasal route 2 (two) times daily.      montelukast 10 MG Oral Tab Take 1 tablet (10 mg total) by mouth nightly. 90 tablet 3    atorvastatin 10 MG Oral Tab Take 1 tablet (10 mg total) by mouth nightly. 90 tablet 3    guaiFENesin (MUCINEX OR) Take by mouth.      ZINC OR Use as directed in the mouth or throat.      Fexofenadine HCl (ALLEGRA OR)       Multiple Vitamins-Minerals (OCUVITE ADULT FORMULA OR) Take by mouth.        Past Medical History:    Allergic rhinitis    Cancer (HCC)    squamous and basal cell ca    Cataract    ANGIE II (cervical intraepithelial neoplasia II)    COLPO    Diverticulosis of large intestine    Eye disease    Flatulence/gas pain/belching    Hemorrhoids    HGSIL (high grade squamous intraepithelial dysplasia)    Hyperlipidemia    Macular degeneration    Osteoarthritis    Sinusitis    Smoker    Varicose vein of leg    Visual impairment    glasses      Social History:  Social History     Socioeconomic History    Marital status:    Occupational History    Occupation: retired    Tobacco Use    Smoking status: Some Days     Current packs/day: 0.50     Average packs/day: 0.5 packs/day for 35.0 years (17.5 ttl pk-yrs)     Types: Cigarettes    Smokeless tobacco: Never    Tobacco comments:     pt did quit with chantix however has had ciggs here and there   Vaping Use    Vaping status: Never Used   Substance and Sexual Activity    Alcohol use: Yes     Alcohol/week: 2.0 - 3.0  standard drinks of alcohol     Types: 2 - 3 Glasses of wine per week     Comment: 1-2 a week, perhaps    Drug use: No    Sexual activity: Not Currently     Partners: Male   Other Topics Concern    Caffeine Concern No    Exercise Yes    Seat Belt Yes    Special Diet No    Stress Concern No    Weight Concern No      Past Surgical History:   Procedure Laterality Date    Appendectomy      Colonoscopy      Colonoscopy N/A 4/28/2023    Procedure: COLONOSCOPY with cold snare polypectomy;  Surgeon: Johnathan Porter DO;  Location:  ENDOSCOPY    Colposcopy, cervix w/upper adjacent vagina; w/endocervical curettage  10/2014    ANGIE 2    Leep  11/2014    Nasal surg proc unlisted  1992    deviated septum    Other  2007    nasal polypectomy    Other surgical history Left 2009    arm, squamos cell carcinoma removal    Other surgical history  2022    squamous cell surgery         REVIEW OF SYSTEMS:   GENERAL HEALTH: feels well otherwise  GENERAL : denies fever, chills, sweats, weight loss, weight gain  SKIN: denies any unusual skin lesions or rashes  RESPIRATORY: denies shortness of breath with exertion  NEURO: denies headaches    EXAM:   LMP  (LMP Unknown)     System Findings Details   Constitutional  Overall appearance - Normal.   Psychiatric  Orientation - Oriented to time, place, person & situation. Appropriate mood and affect.   Head/Face  Facial features -- Normal. Skull - Normal.   Eyes  Pupils equal ,round ,react to light and accomidate   Ears, Nose, Throat, Neck  Ears clear no fluid nose clear oral cavity clear pharynx free of lesions neck is supple without masses but there is some fullness and swelling of the tail of parotid on the right.   Neurological  Memory - Normal. Cranial nerves - Cranial nerves II through XII grossly intact.   Lymph Detail  Submental. Submandibular. Anterior cervical. Posterior cervical. Supraclavicular.       ASSESSMENT AND PLAN:   1. Parotitis  I do not necessarily palpate a mass in the tail of  the parotid on the right but it is stayed persistently swollen.  I will therefore do a CT of the neck and then we will speak after the above.  - CT SOFT TISSUE OF NECK(CONTRAST ONLY) (CPT=70491); Future    2. Dysfunction of both eustachian tubes  Audiogram reviewed which shows some mild hearing loss.  She has done better with that symptom after treatment with steroid.      The patient indicates understanding of these issues and agrees to the plan.    No follow-ups on file.    Sandor Joyce MD  8/15/2024  3:57 PM

## 2024-08-15 NOTE — PROGRESS NOTES
Yessi Spence was seen for an audiometric evaluation and tympanogram today. Referred back to physician.    Jayne Silverio, AuD

## 2024-09-09 ENCOUNTER — HOSPITAL ENCOUNTER (OUTPATIENT)
Dept: CT IMAGING | Age: 69
Discharge: HOME | End: 2024-09-09
Attending: OTOLARYNGOLOGY
Payer: MEDICARE

## 2024-09-09 DIAGNOSIS — K11.20 PAROTITIS: ICD-10-CM

## 2024-09-09 PROCEDURE — 70491 CT SOFT TISSUE NECK W/DYE: CPT | Performed by: OTOLARYNGOLOGY

## 2024-10-02 ENCOUNTER — OFFICE VISIT (OUTPATIENT)
Dept: FAMILY MEDICINE CLINIC | Facility: CLINIC | Age: 69
End: 2024-10-02
Payer: MEDICARE

## 2024-10-02 VITALS
OXYGEN SATURATION: 98 % | HEIGHT: 64 IN | WEIGHT: 179 LBS | BODY MASS INDEX: 30.56 KG/M2 | RESPIRATION RATE: 18 BRPM | HEART RATE: 78 BPM | TEMPERATURE: 98 F | DIASTOLIC BLOOD PRESSURE: 78 MMHG | SYSTOLIC BLOOD PRESSURE: 124 MMHG

## 2024-10-02 DIAGNOSIS — N87.1 CIN II (CERVICAL INTRAEPITHELIAL NEOPLASIA II): ICD-10-CM

## 2024-10-02 DIAGNOSIS — J30.89 NON-SEASONAL ALLERGIC RHINITIS, UNSPECIFIED TRIGGER: ICD-10-CM

## 2024-10-02 DIAGNOSIS — I45.10 INCOMPLETE RIGHT BUNDLE BRANCH BLOCK: ICD-10-CM

## 2024-10-02 DIAGNOSIS — J44.9 COPD, MILD (HCC): ICD-10-CM

## 2024-10-02 DIAGNOSIS — Z78.0 POSTMENOPAUSAL: ICD-10-CM

## 2024-10-02 DIAGNOSIS — E78.5 DYSLIPIDEMIA: ICD-10-CM

## 2024-10-02 DIAGNOSIS — R87.613 HGSIL (HIGH GRADE SQUAMOUS INTRAEPITHELIAL LESION) ON PAP SMEAR OF CERVIX: ICD-10-CM

## 2024-10-02 DIAGNOSIS — J41.0 SMOKERS' COUGH (HCC): Chronic | ICD-10-CM

## 2024-10-02 DIAGNOSIS — I83.90 VARICOSE VEINS: ICD-10-CM

## 2024-10-02 DIAGNOSIS — Z13.29 SCREENING FOR THYROID DISORDER: ICD-10-CM

## 2024-10-02 DIAGNOSIS — Z00.00 ENCOUNTER FOR ANNUAL HEALTH EXAMINATION: Primary | ICD-10-CM

## 2024-10-02 DIAGNOSIS — Z12.31 VISIT FOR SCREENING MAMMOGRAM: ICD-10-CM

## 2024-10-02 DIAGNOSIS — Z13.1 SCREENING FOR DIABETES MELLITUS (DM): ICD-10-CM

## 2024-10-02 DIAGNOSIS — Z13.0 SCREENING, ANEMIA, DEFICIENCY, IRON: ICD-10-CM

## 2024-10-02 DIAGNOSIS — R06.02 SHORTNESS OF BREATH: ICD-10-CM

## 2024-10-02 DIAGNOSIS — Z71.6 ENCOUNTER FOR TOBACCO USE CESSATION COUNSELING: ICD-10-CM

## 2024-10-02 RX ORDER — ATORVASTATIN CALCIUM 10 MG/1
10 TABLET, FILM COATED ORAL NIGHTLY
Qty: 90 TABLET | Refills: 3 | Status: SHIPPED | OUTPATIENT
Start: 2024-10-02

## 2024-10-02 RX ORDER — MONTELUKAST SODIUM 10 MG/1
10 TABLET ORAL NIGHTLY
Qty: 90 TABLET | Refills: 3 | Status: SHIPPED | OUTPATIENT
Start: 2024-10-02

## 2024-10-02 NOTE — PATIENT INSTRUCTIONS
Go to the [facility] lab for your fasting blood tests. Do not eat or drink except for water for at least 8 hours prior to the blood tests. Do not take any vitamins or Biotin for 3 days before your blood tests.    Schedule your mammogram and your bone density test as ordered.    Get your RSV vaccine.    Stop smoking.    Continue your medicatons as prescribed.    Keep your appointment with Dr. Joyce.    Keep your appointment with Dr. Oppenheim.    Recommendations for exercise are 3-5 times weekly for 30-60 minutes for a minimum of 150-300 minutes.     For premenopausal women and men, 1000 mg of calcium daily is recommended. For postmenopausal women, 1200 mg of calcium daily is recommended.    To help the body absorb and use calcium, vitamin D 2000 international units daily is recommended.    I will contact you with your test results once available.    See me in 6 months.

## 2024-10-02 NOTE — PROGRESS NOTES
Subjective:   Yessi Spence is a 69 year old female who presents for a Medicare Subsequent Annual Wellness visit (Pt already had Initial Annual Wellness) and scheduled follow up of multiple significant but stable problems.     The patient has past history for dyslipidemia and is currently taking atorvastatin 10 mg nightly.  She states she needs a refill on this medication.  She denies any past history for hypertension, diabetes, coronary artery disease. She saw Dr. Hall on 4/30/2024 in follow-up of her abnormal EKG which showed incomplete bundle branch block.  Nuclear stress test was negative for ischemia and echocardiogram was unremarkable.  The patient only needs to follow-up with him for any new cardiac related symptoms.  She is currently denying any chest pain, dizziness, syncope or leg swelling.  She has occasional dizziness which she attributes to her allergies.    The patient also has significant problems with allergies and recurrent sinus infections.  She has seen an allergist in the past.  She is currently taking Singulair 10 mg nightly and using Astepro nasal spray.  She states her symptoms are not controlled.  She has chronic sinus congestion, postnasal drip and cough.  She states she has chronic mucus in the back of her throat which takes her about an hour to clear in the morning.  She will sometimes use a [place] pot to help rinse her sinuses.  She has no past history for asthma or pneumonia.  She had been seen by Dr. Joyce of ENT for evaluation of swelling to the right parotid gland.  CT of the soft tissue showed symmetrical parotid gland.  Dental hardware artifact limits the evaluation.  No significant adenopathy was present.  The patient has a follow-up appointment with Dr. Joyce on Monday, 10/7/2024 because she feels her symptoms are not improving.  She is not currently having any fever, chills or purulent mucus.    The patient continues to have a smoker's cough.  She denies any hemoptysis.   PFTs done last year showed mild COPD.  She has occasional shortness of breath.  But she states she is able to get around and do her normal daily activity without having to stop due to shortness of breath.  She is still smoking 4 to 10 cigarettes/day.  The patient is aware that she should stop but she is not ready to quit.    Patient has a past history for abnormal Pap smear.  She had ANGIE 2 which required a LEEP.  She is being followed by a gynecologist., Dr. De La O. She had her Pap smear done on 10/9/2023 and it was normal.  She states she should see him again in 3 years    The patient complains of intermittent pain in the right leg from her varicose veins but no swelling. She stated she had the varicose veins in the left leg treated previously.  She is not having enough pain in the right leg at this time that she wants to have any intervention done on the varicosities.    She has a history for dry macular degeneration.  She denies any new change in vision or eye pain.  She is scheduled to see her eye doctor, Dr. Oppenheimer next week.    She did have colonoscopy done in April 2023. Her next one is due on 4/28/2030.    The patient denies any recent fever, chills, abdominal pain, nausea, vomiting, diarrhea, melena, hematochezia, dysuria, hematuria, history for anemia or thyroid problems.    History/Other:   Fall Risk Assessment:   She has been screened for Falls and is low risk.      Cognitive Assessment:   She had a completely normal cognitive assessment - see flowsheet entries     Functional Ability/Status:   Yessi Spence has some abnormal functions as listed below:  She has Vision problems based on screening of functional status.       Depression Screening (PHQ-2/PHQ-9): PHQ-2 SCORE: 0  , done 10/2/2024   If you checked off any problems, how difficult have these problems made it for you to do your work, take care of things at home, or get along with other people?: Not difficult at all    Last [place] Suicide  Screening on 10/2/2024 was No Risk.       Advanced Directives:   She does NOT have a Living Will. [Do you have a living will?: No]  She does have a POA but we do NOT have it on file in [facility].    Patient has Advance Care Planning documents but we do not have a copy in EMR. Discussed Advanced Care Planning with patient and instructed patient to get our office a copy to be scanned into EMR.    Patient stated she did not want CPR under any circumstances.      Patient Active Problem List   Diagnosis    HGSIL (high grade squamous intraepithelial lesion) on Pap smear of cervix    Diverticulosis    Dyslipidemia    Nasal polyps    Chronic sinusitis    Non-seasonal allergic rhinitis    Tobacco use    Allergic reaction to insect bite    Atrophic vaginitis    Smokers' cough (HCC)    COPD, mild (HCC)     Allergies:  She is allergic to dust mites, other, tetracycline, and mold.    Current Medications:  Outpatient Medications Marked as Taking for the 10/2/24 encounter (Office Visit) with Mely Amaral DO   Medication Sig    atorvastatin 10 MG Oral Tab Take 1 tablet (10 mg total) by mouth nightly.    montelukast 10 MG Oral Tab Take 1 tablet (10 mg total) by mouth nightly.    azelastine 0.1 % Nasal Solution by Nasal route 2 (two) times daily.    guaiFENesin (MUCINEX OR) Take by mouth.    ZINC OR Use as directed in the mouth or throat.    Fexofenadine HCl (ALLEGRA OR)     Multiple Vitamins-Minerals (OCUVITE ADULT FORMULA OR) Take by mouth.     Medical History:  She  has a past medical history of Allergic rhinitis, Cancer (HCC), Cataract, ANGIE II (cervical intraepithelial neoplasia II) (10/2014), Diverticulosis of large intestine, Eye disease, Flatulence/gas pain/belching, Hemorrhoids, HGSIL (high grade squamous intraepithelial dysplasia) (08/2014), Hyperlipidemia, Macular degeneration, Osteoarthritis, Sinusitis, Smoker, Varicose vein of leg, and Visual impairment.  Surgical History:  She  has a past surgical history that includes  nasal surg proc unlisted (1992); other surgical history (Left, 2009); colposcopy, cervix w/upper adjacent vagina; w/endocervical curettage (10/2014); appendectomy; other (2007); leep (11/2014); colonoscopy; other surgical history (2022); and colonoscopy (N/A, 4/28/2023).   Family History:  Her family history includes Breast Cancer (age of onset: 46) in her mother; Breast Cancer (age of onset: 55) in her maternal aunt; Breast Cancer (age of onset: 60) in her paternal aunt, paternal aunt, and paternal cousin male; Breast Cancer (age of onset: 62) in her paternal cousin male; Cancer in her father; Heart Disease in her father and mother; Heart Disorder in her mother; Hypertension in her father; Lipids in her mother; Thyroid Disorder in her mother.  Social History:  She  reports that she has been smoking cigarettes. She has a 17.5 pack-year smoking history. She has never used smokeless tobacco. She reports current alcohol use of about 2.0 - 3.0 standard drinks of alcohol per week. She reports that she does not use drugs.    Tobacco:  She currently smokes tobacco.  Social History    Tobacco Use      Smoking status: Some Days        Packs/day: 0.50        Years: 35.00        Additional pack years: 0.00        Total pack years: 17.50        Types: Cigarettes      Smokeless tobacco: Never      Tobacco comments: pt did quit with chantix however has had ciggs here and there     Tobacco Cessation Documentation (Smoking and Smokeless included):  I had an in depth therapy session with Yessi Spence about her tobacco use risks and options using the USPSTF's Five A's approach:    Ask: Yessi is using tobacco products.  Assess: We asked about/assessed behavioral health risk and factors affecting choice of behavior change goals/methods.  Specifically I asked about readiness to quit tobacco.  Advise: We gave clear, specific, and personalized behavior change advice, including information about personal health harms and benefits.  Specifically, she was told that Quitting tobacco is one of the best things she can do for her health. I strongly encouraged her to quit.      Agree: We collaboratively selected appropriate treatment goals and methods based on the patient’s interest in and willingness to change the behavior.   Assist: We used behavior change techniques (self-help and/or counseling), to aid Yessi in achieving agreed-upon goals by acquiring the skills, confidence, and social/environmental supports for behavior change, supplemented with adjunctive medical treatments when appropriate. Additionally, national quitting tobacco aides were discussed.   Arrange: Follow up at next visit- see specific follow up below.    Time Counseled: 3 minutes - See HPI       CAGE Alcohol Screen:   CAGE screening score of 0 on 9/24/2024, showing low risk of alcohol abuse.      Patient Care Team:  Mely Amaral DO as PCP - General (Family Medicine)  Krish Knight MD (GASTROENTEROLOGY)    Review of Systems    ROS was negative except as stated above in HPI    Objective:   Physical Exam      /78 (BP Location: Left arm, Patient Position: Sitting, Cuff Size: adult)   Pulse 78   Temp 98.1 °F (36.7 °C)   Resp 18   Ht 5' 4\" (1.626 m)   Wt 179 lb (81.2 kg)   LMP  (LMP Unknown)   SpO2 98%   BMI 30.73 kg/m²  Estimated body mass index is 30.73 kg/m² as calculated from the following:    Height as of this encounter: 5' 4\" (1.626 m).    Weight as of this encounter: 179 lb (81.2 kg).    Wt Readings from Last 6 Encounters:   10/02/24 179 lb (81.2 kg)   09/20/23 179 lb (81.2 kg)   04/28/23 168 lb (76.2 kg)   04/25/23 170 lb (77.1 kg)   09/12/22 178 lb 8 oz (81 kg)   08/27/21 178 lb 4 oz (80.9 kg)         Medicare Hearing Assessment:   Hearing Screening    Time taken: 10/2/2024  9:59 AM  Entry User: Mely Amaral DO  Screening Method: [place]  [place] Result: Pass         Visual Acuity:   Right Eye Visual Acuity: Corrected Right Eye Chart Acuity:  20/30   Left Eye Visual Acuity: Corrected Left Eye Chart Acuity: 20/30   Both Eyes Visual Acuity: Corrected Both Eyes Chart Acuity: 20/30   Able To Tolerate Visual Acuity: Yes      Weight is stable from last OV.    General: WH/Obese/WD, in NAD, A and O times 3  HEAD: Normocephalic, atraumatic  EYES: EDWARD, EOMI, conjunctiva normal, sclera anicteric  EARS: Tympanic membranes normal, EAC's normal.  NOSE: Turbninates congested, no bleeding noted.  PHARYNX:  No eythema or exudates, mucous membrane moist, airway patent. Post nasal drip is noted.  NECK:  No cervical lymphadenopathy or thyromegaly, No bruits noted. No tenderness over the parotid gland noted.  HEART: Regular rate and rhythm, no S3, S4 or murmur noted.  LUNGS: Clear to ausculation. No retractions or tachypnea noted.  BREASTS: No masses, discharge or retractions noted bilaterally.  No axillary lymph nodes palpated bilaterally.  ABDOMEN: Soft, obese, nontender, no guarding, rigidity or rebound, no masses or hepatosplenomegaly, normal bowel sounds in all four quadrants.  EXTREMITIES: No clubbing, cyanosis, edema noted. DTR's +2/4 in all 4 extremities. Motor +5/5 in all 4 extremities. Varicose veins noted right leg. No evidence for thrombophlebitis.  SKIN: Warm and dry.  NEURO: Cr. N. II-XII intact, normal gait  PSYCH: Normal mood and affect.    Assessment & Plan:   Yessi Spence is a 69 year old female who presents for a Medicare Assessment.     1. Encounter for annual health examination (Primary)    Patient has received her flu shot and the new COVID booster 2 weeks ago.  I reminded the patient to get the RSV vaccine at her local pharmacy.    2. Screening, anemia, deficiency, iron    -     CBC With Differential With Platelet    3. Screening for thyroid disorder    -     TSH W Reflex To Free T4    4. Screening for diabetes mellitus (DM)    -     Hemoglobin A1C    5. Encounter for screening mammogram for malignant   neoplasm of breast    -     3D Mammogram  Digital Screen, Bilateral (CPT=77067/43030); Future; Expected date: 09/20/2023    6. Dyslipidemia    Cholesterol:     Lab Results   Component Value Date    CHOLEST 189 10/25/2023    CHOLEST 211 (H) 10/26/2022    CHOLEST 202 (H) 10/25/2021     Lab Results   Component Value Date    HDL 60 10/25/2023    HDL 62 10/26/2022    HDL 65 10/25/2021     Lab Results   Component Value Date    TRIG 130 10/25/2023    TRIG 152 (H) 10/26/2022    TRIG 127 10/25/2021     Lab Results   Component Value Date     (H) 10/25/2023     (H) 10/26/2022     (H) 10/25/2021     Lab Results   Component Value Date    AST 18 10/25/2023    AST 18 10/26/2022    AST 19 10/25/2021     Lab Results   Component Value Date    ALT 17 10/25/2023    ALT 20 10/26/2022    ALT 20 10/25/2021     Patient is due for recheck on her lipid panel and CMP. I refilled her Atorvastatin 10 mg nightly.    -     Lipid Panel  -     Comp Metabolic Panel (14)  -     Atorvastatin Calcium; Take 1 tablet (10 mg total) by mouth nightly.  Dispense: 90 tablet; Refill: 3    7. Incomplete right bundle branch block    Patient has been evaluated by cardiology.  She has seen Dr. Hall in April with no current cardiac symptoms.  Nuclear stress test was negative for ischemia and echocardiogram was unremarkable.  She has had no new symptoms.  Cardiology stated she does not need follow-up unless she has change in symptoms.      8. Smokers' cough (HCC)  9. COPD-mild  10. Shortness of breath  11. Tobacco use    I emphasized the need to stop smoking.  The patient was reminded that her PFT showed mild COPD.  She is not currently having any worsening difficulty with her breathing.  The patient is not ready to quit smoking at this time.    12. Non-seasonal allergic rhinitis, unspecified trigger.    I refilled her Montelukast. She should continue the Azelastine nasal spray. She will see Dr. Joyce on 10/7/2024.     -     Montelukast Sodium; Take 1 tablet (10 mg total) by mouth  nightly.  Dispense: 90 tablet; Refill: 3      13. HGSIL (high grade squamous intraepithelial lesion) on Pap smear of cervix  14. ANGIE II (cervical intraepithelial neoplasia II)    Overview:  colpo 10/14-ANGIE 2, ECC pos ANGIE 2  LEEP with endocervical leep and ECC to be done 11/14- ANGIE 2-3 with pos endocervical margins and +ECC. Sent to Dr. Castro for poss cone biopsy  Dr. Castro recommended frequent pap and not repeat cone. All paps normal since that time.   Pap with HR HPV negative 9/16  Pap 9/17- neg  Repeat pap in 3 years    Patient had repeat Pap in October 23 which was normal.  She is to follow-up with her gynecologist, Dr. Coleman De La O in 3 years.    16. Varicose veins    Patient will let me know when she wants to have treatment for her varicose veins. She states she is not ready yet.      The patient indicates understanding of these issues and agrees to the plan.  Reinforced healthy diet, lifestyle, and exercise.      Return in 6 months (on 4/2/2025).     Mely Amaral DO, 10/02/2024    Supplementary Documentation:   General Health:  In the past six months, have you lost more than 10 pounds without trying?: 2 - No  Has your appetite been poor?: No  Type of Diet: Balanced  How does the patient maintain a good energy level?: Daily Walks;Stretching  How would you describe your daily physical activity?: Moderate  How would you describe your current health state?: Fair  How do you maintain positive mental well-being?: Social Interaction  On a scale of 0 to 10, with 0 being no pain and 10 being severe pain, what is your pain level?: 2 - (Mild)  In the past six months, have you experienced urine leakage?: 1-Yes  At any time do you feel concerned for the safety/well-being of yourself and/or your children, in your home or elsewhere?: No  Have you had any immunizations at another office such as Influenza, Hepatitis B, Tetanus, or Pneumococcal?: Yes       Yessi Spence's SCREENING SCHEDULE   Tests on this list are  recommended by your physician but may not be covered, or covered at this frequency, by your insurer.   Please check with your insurance carrier before scheduling to verify coverage.   PREVENTATIVE SERVICES FREQUENCY &  COVERAGE DETAILS LAST COMPLETION DATE   Diabetes Screening    Fasting Blood Sugar /  Glucose    One screening every 12 months if never tested or if previously tested but not diagnosed with pre-diabetes   One screening every 6 months if diagnosed with pre-diabetes Lab Results   Component Value Date    GLU 86 10/25/2023        Cardiovascular Disease Screening    Lipid Panel  Cholesterol  Lipoprotein (HDL)  Triglycerides Covered every 5 years for all Medicare beneficiaries without apparent signs or symptoms of cardiovascular disease Lab Results   Component Value Date    CHOLEST 189 10/25/2023    HDL 60 10/25/2023     (H) 10/25/2023    TRIG 130 10/25/2023         Electrocardiogram (EKG)   Covered if needed at Welcome to Medicare, and non-screening if indicated for medical reasons 09/20/2023      Ultrasound Screening for Abdominal Aortic Aneurysm (AAA) Covered once in a lifetime for one of the following risk factors    Men who are 65-75 years old and have ever smoked    Anyone with a family history -     Colorectal Cancer Screening  Covered for ages 50-85; only need ONE of the following:    Colonoscopy   Covered every 10 years    Covered every 2 years if patient is at high risk or previous colonoscopy was abnormal 04/28/2023    Health Maintenance   Topic Date Due    Colorectal Cancer Screening  04/28/2030       Flexible Sigmoidoscopy   Covered every 4 years -    Fecal Occult Blood Test Covered annually -   Bone Density Screening    Bone density screening    Covered every 2 years after age 65 if diagnosed with risk of osteoporosis or estrogen deficiency.    Covered yearly for long-term glucocorticoid medication use (Steroids) Last Dexa Scan:    XR DEXA BONE DENSITOMETRY (CPT=77080) 11/24/2020      No  recommendations at this time   Pap and Pelvic    Pap   Covered every 2 years for women at normal risk; Annually if at high risk 10/09/2023  Health Maintenance   Topic Date Due    Pap Smear  10/09/2026       Chlamydia Annually if high risk -  No recommendations at this time   Screening Mammogram    Mammogram     Recommend annually for all female patients aged 40 and older    One baseline mammogram covered for patients aged 35-39 12/06/2023    Health Maintenance   Topic Date Due    Mammogram  12/06/2024       Immunizations    Influenza Covered once per flu season  Please get every year 09/06/2024  No recommendations at this time    Pneumococcal Each vaccine ([id number] & [id number]) covered once after 65 Prevnar 13: -    [id number]: -     No recommendations at this time    Hepatitis B One screening covered for patients with certain risk factors   -  No recommendations at this time    Tetanus Toxoid Not covered by Medicare Part B unless medically necessary (cut with metal); may be covered with your pharmacy prescription benefits -    Tetanus, Diptheria and Pertusis TD and TDaP Not covered by Medicare Part B -  No recommendations at this time    Zoster Not covered by Medicare Part B; may be covered with your pharmacy  prescription benefits -  No recommendations at this time     Chronic Obstructive Pulmonary Disease (COPD)    Spirometry Annually Spirometry date: 09/29/2023         Total time 65 minutes precharting, H&P, plan of care of which 45 minutes was spent addressing her medical conditions in addition to the wellness visit.    This dictation was performed with a verbal recognition program (DRAGON) and it was checked for errors. It is possible that there are still dictated errors within this office note. If so, please bring any errors to my attention for an addendum. All efforts were made to ensure that this office note is accurate

## 2024-10-10 ENCOUNTER — OFFICE VISIT (OUTPATIENT)
Facility: LOCATION | Age: 69
End: 2024-10-10
Payer: MEDICARE

## 2024-10-10 DIAGNOSIS — K11.20 PAROTITIS: ICD-10-CM

## 2024-10-10 DIAGNOSIS — J30.0 VASOMOTOR RHINITIS: Primary | ICD-10-CM

## 2024-10-10 PROCEDURE — 99213 OFFICE O/P EST LOW 20 MIN: CPT | Performed by: OTOLARYNGOLOGY

## 2024-10-10 RX ORDER — IPRATROPIUM BROMIDE 42 UG/1
2 SPRAY, METERED NASAL 2 TIMES DAILY
Qty: 3 EACH | Refills: 3 | Status: SHIPPED | OUTPATIENT
Start: 2024-10-10

## 2024-10-10 NOTE — PROGRESS NOTES
Yessi Spence is a 69 year old female. No chief complaint on file.    HPI:   Her parotitis issues still happen but seem to be less severe.  She has had worsening chronic runny nose.  She will take an hour and a half every morning clear her throat and blowing her nose.  She has tried Flonase saline Zyrtec Mucinex and Astelin.    REVIEW OF SYSTEMS:   GENERAL HEALTH: feels well otherwise  GENERAL : denies fever, chills, sweats, weight loss, weight gain  SKIN: denies any unusual skin lesions or rashes  RESPIRATORY: denies shortness of breath with exertion  NEURO: denies headaches    EXAM:   LMP  (LMP Unknown)     System Findings Details   Constitutional  Overall appearance - Normal.   Psychiatric  Orientation - Oriented to time, place, person & situation. Appropriate mood and affect.   Head/Face  Facial features -- Normal. Skull - Normal.   Eyes  Pupils equal ,round ,react to light and accomidate   Ears, Nose, Throat, Neck  Ears clear nose mild congestion oropharynx clear neck supple without masses   Neurological  Memory - Normal. Cranial nerves - Cranial nerves II through XII grossly intact.   Lymph Detail  Submental. Submandibular. Anterior cervical. Posterior cervical. Supraclavicular.       ASSESSMENT AND PLAN:   1. Vasomotor rhinitis  CT of the neck was reviewed.  This shows some small polyps in the maxillary sinuses otherwise clear.  She has tried numerous medicines without relief.  I will try her on Atrovent nasal spray.  If this fails she may need a Clarifix or neuro marked procedure.    2. Parotitis  Improved.  She will continue with hydration and sialagogues.      The patient indicates understanding of these issues and agrees to the plan.    No follow-ups on file.    Sandor Joyce MD  10/10/2024  2:13 PM

## 2024-11-06 LAB
ABSOLUTE BASOPHILS: 49 CELLS/UL (ref 0–200)
ABSOLUTE EOSINOPHILS: 238 CELLS/UL (ref 15–500)
ABSOLUTE LYMPHOCYTES: 1922 CELLS/UL (ref 850–3900)
ABSOLUTE MONOCYTES: 470 CELLS/UL (ref 200–950)
ABSOLUTE NEUTROPHILS: 2722 CELLS/UL (ref 1500–7800)
ALBUMIN/GLOBULIN RATIO: 1.8 (CALC) (ref 1–2.5)
ALBUMIN: 4.1 G/DL (ref 3.6–5.1)
ALKALINE PHOSPHATASE: 78 U/L (ref 37–153)
ALT: 27 U/L (ref 6–29)
AST: 21 U/L (ref 10–35)
BASOPHILS: 0.9 %
BILIRUBIN, TOTAL: 0.5 MG/DL (ref 0.2–1.2)
BUN: 17 MG/DL (ref 7–25)
CALCIUM: 9.3 MG/DL (ref 8.6–10.4)
CARBON DIOXIDE: 30 MMOL/L (ref 20–32)
CHLORIDE: 109 MMOL/L (ref 98–110)
CHOL/HDLC RATIO: 3.2 (CALC)
CHOLESTEROL, TOTAL: 199 MG/DL
CREATININE: 0.78 MG/DL (ref 0.5–1.05)
EGFR: 82 ML/MIN/1.73M2
EOSINOPHILS: 4.4 %
GLOBULIN: 2.3 G/DL (CALC) (ref 1.9–3.7)
GLUCOSE: 89 MG/DL (ref 65–99)
HDL CHOLESTEROL: 62 MG/DL
HEMATOCRIT: 45 % (ref 35–45)
HEMOGLOBIN A1C: 5.5 % OF TOTAL HGB
HEMOGLOBIN: 14.2 G/DL (ref 11.7–15.5)
LDL-CHOLESTEROL: 116 MG/DL (CALC)
LYMPHOCYTES: 35.6 %
MCH: 31.3 PG (ref 27–33)
MCHC: 31.6 G/DL (ref 32–36)
MCV: 99.1 FL (ref 80–100)
MONOCYTES: 8.7 %
MPV: 10.1 FL (ref 7.5–12.5)
NEUTROPHILS: 50.4 %
NON-HDL CHOLESTEROL: 137 MG/DL (CALC)
PLATELET COUNT: 216 THOUSAND/UL (ref 140–400)
POTASSIUM: 4.9 MMOL/L (ref 3.5–5.3)
PROTEIN, TOTAL: 6.4 G/DL (ref 6.1–8.1)
RDW: 12.1 % (ref 11–15)
RED BLOOD CELL COUNT: 4.54 MILLION/UL (ref 3.8–5.1)
SODIUM: 143 MMOL/L (ref 135–146)
TRIGLYCERIDES: 104 MG/DL
TSH W/REFLEX TO FT4: 1.99 MIU/L (ref 0.4–4.5)
WHITE BLOOD CELL COUNT: 5.4 THOUSAND/UL (ref 3.8–10.8)

## 2024-11-07 DIAGNOSIS — E78.5 DYSLIPIDEMIA: ICD-10-CM

## 2024-11-07 RX ORDER — ATORVASTATIN CALCIUM 20 MG/1
20 TABLET, FILM COATED ORAL NIGHTLY
Qty: 90 TABLET | Refills: 3 | Status: SHIPPED | OUTPATIENT
Start: 2024-11-07

## 2024-11-25 ENCOUNTER — TELEPHONE (OUTPATIENT)
Dept: FAMILY MEDICINE CLINIC | Facility: CLINIC | Age: 69
End: 2024-11-25

## 2024-11-25 NOTE — TELEPHONE ENCOUNTER
Yessi Spence  LOV: 10/2/2024       Calling for new New or Existing condition (choose one)  Patient experiencing: sinus/ear pain  (list symptoms/concerns)  Duration/Onset of symptom: 11/21/24  Appointment Offered: wanted to speak to nurse

## 2024-12-16 ENCOUNTER — HOSPITAL ENCOUNTER (OUTPATIENT)
Dept: MAMMOGRAPHY | Age: 69
Discharge: HOME | End: 2024-12-16
Attending: FAMILY MEDICINE
Payer: MEDICARE

## 2024-12-16 ENCOUNTER — HOSPITAL ENCOUNTER (OUTPATIENT)
Dept: BONE DENSITY | Age: 69
Discharge: HOME | End: 2024-12-16
Attending: FAMILY MEDICINE
Payer: MEDICARE

## 2024-12-16 DIAGNOSIS — Z78.0 POSTMENOPAUSAL: ICD-10-CM

## 2024-12-16 DIAGNOSIS — Z12.31 VISIT FOR SCREENING MAMMOGRAM: ICD-10-CM

## 2024-12-16 PROCEDURE — 77080 DXA BONE DENSITY AXIAL: CPT | Performed by: FAMILY MEDICINE

## 2024-12-16 PROCEDURE — 77067 SCR MAMMO BI INCL CAD: CPT | Performed by: FAMILY MEDICINE

## 2024-12-16 PROCEDURE — 77063 BREAST TOMOSYNTHESIS BI: CPT | Performed by: FAMILY MEDICINE

## 2025-05-29 ENCOUNTER — OFFICE VISIT (OUTPATIENT)
Dept: FAMILY MEDICINE CLINIC | Facility: CLINIC | Age: 70
End: 2025-05-29
Payer: MEDICARE

## 2025-05-29 VITALS
TEMPERATURE: 98 F | RESPIRATION RATE: 18 BRPM | SYSTOLIC BLOOD PRESSURE: 118 MMHG | OXYGEN SATURATION: 99 % | DIASTOLIC BLOOD PRESSURE: 58 MMHG | HEART RATE: 62 BPM

## 2025-05-29 DIAGNOSIS — J01.00 ACUTE NON-RECURRENT MAXILLARY SINUSITIS: Primary | ICD-10-CM

## 2025-05-29 DIAGNOSIS — Z71.6 ENCOUNTER FOR TOBACCO USE CESSATION COUNSELING: ICD-10-CM

## 2025-05-29 DIAGNOSIS — H69.92 ACUTE DYSFUNCTION OF LEFT EUSTACHIAN TUBE: ICD-10-CM

## 2025-05-29 DIAGNOSIS — J41.0 SMOKERS' COUGH (HCC): Chronic | ICD-10-CM

## 2025-05-29 PROCEDURE — G2211 COMPLEX E/M VISIT ADD ON: HCPCS | Performed by: FAMILY MEDICINE

## 2025-05-29 PROCEDURE — 1159F MED LIST DOCD IN RCRD: CPT | Performed by: FAMILY MEDICINE

## 2025-05-29 PROCEDURE — 1160F RVW MEDS BY RX/DR IN RCRD: CPT | Performed by: FAMILY MEDICINE

## 2025-05-29 PROCEDURE — 1126F AMNT PAIN NOTED NONE PRSNT: CPT | Performed by: FAMILY MEDICINE

## 2025-05-29 PROCEDURE — 99213 OFFICE O/P EST LOW 20 MIN: CPT | Performed by: FAMILY MEDICINE

## 2025-05-29 PROCEDURE — 1170F FXNL STATUS ASSESSED: CPT | Performed by: FAMILY MEDICINE

## 2025-05-29 RX ORDER — AMOXICILLIN 875 MG/1
875 TABLET, COATED ORAL 2 TIMES DAILY
Qty: 20 TABLET | Refills: 0 | Status: SHIPPED | OUTPATIENT
Start: 2025-05-29

## 2025-05-29 RX ORDER — PREDNISONE 20 MG/1
TABLET ORAL
Qty: 10 TABLET | Refills: 0 | Status: SHIPPED | OUTPATIENT
Start: 2025-05-29

## 2025-05-29 RX ORDER — ALBUTEROL SULFATE 90 UG/1
2 INHALANT RESPIRATORY (INHALATION) EVERY 4 HOURS PRN
COMMUNITY
Start: 2025-01-27

## 2025-05-29 NOTE — PATIENT INSTRUCTIONS
Take the Amoxil 875 mg one tablet with breakfast and dinner for 10 days. While on the antibiotics, eat yogurt or take a probiotic to help replenish the good bacteria in your intestines.    Take the Prednisone 20 mg 2 tablets with lunch for 5 days. Take your prednisone with a full meal and early in the day.  Do not take any Ibuprofen, Advil, Motrin, Naproxen, Aspirin while on Prednisone. Tylenol is OK for fever or pain.    Continue all your allergy medications and nasal sprays.    Stop smoking.    Push fluids and stay well hydrated.    Make an appointment for your Medicare annual wellness exam in one month. Let me know if your symptoms are worsening.
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
## 2025-07-24 ENCOUNTER — OFFICE VISIT (OUTPATIENT)
Dept: FAMILY MEDICINE CLINIC | Facility: CLINIC | Age: 70
End: 2025-07-24
Payer: MEDICARE

## 2025-07-24 VITALS
HEART RATE: 84 BPM | TEMPERATURE: 98 F | RESPIRATION RATE: 18 BRPM | OXYGEN SATURATION: 98 % | HEIGHT: 64 IN | DIASTOLIC BLOOD PRESSURE: 78 MMHG | BODY MASS INDEX: 31 KG/M2 | SYSTOLIC BLOOD PRESSURE: 134 MMHG

## 2025-07-24 DIAGNOSIS — J01.00 ACUTE NON-RECURRENT MAXILLARY SINUSITIS: Primary | ICD-10-CM

## 2025-07-24 DIAGNOSIS — J41.0 SMOKERS' COUGH (HCC): Chronic | ICD-10-CM

## 2025-07-24 DIAGNOSIS — Z71.6 ENCOUNTER FOR TOBACCO USE CESSATION COUNSELING: ICD-10-CM

## 2025-07-24 PROCEDURE — 1126F AMNT PAIN NOTED NONE PRSNT: CPT | Performed by: FAMILY MEDICINE

## 2025-07-24 PROCEDURE — 1170F FXNL STATUS ASSESSED: CPT | Performed by: FAMILY MEDICINE

## 2025-07-24 PROCEDURE — 1159F MED LIST DOCD IN RCRD: CPT | Performed by: FAMILY MEDICINE

## 2025-07-24 PROCEDURE — 1160F RVW MEDS BY RX/DR IN RCRD: CPT | Performed by: FAMILY MEDICINE

## 2025-07-24 PROCEDURE — 99213 OFFICE O/P EST LOW 20 MIN: CPT | Performed by: FAMILY MEDICINE

## 2025-07-24 PROCEDURE — G2211 COMPLEX E/M VISIT ADD ON: HCPCS | Performed by: FAMILY MEDICINE

## 2025-07-24 RX ORDER — PREDNISONE 20 MG/1
TABLET ORAL
Qty: 10 TABLET | Refills: 0 | Status: SHIPPED | OUTPATIENT
Start: 2025-07-24

## 2025-07-24 RX ORDER — CEFDINIR 300 MG/1
300 CAPSULE ORAL 2 TIMES DAILY
Qty: 20 CAPSULE | Refills: 0 | Status: SHIPPED | OUTPATIENT
Start: 2025-07-24 | End: 2025-08-03

## 2025-07-24 NOTE — PATIENT INSTRUCTIONS
Take the Omnicef 300 mg one capsule twice daily with food for 10 days.    Take the Prednisone 20 mg 2 tablets with breakfast for 5 days. Take your prednisone with a full meal and early in the day.  Do not take any Ibuprofen, Advil, Motrin, Naproxen, Aspirin while on Prednisone. Tylenol is OK for fever or pain.    Push fluids and stay well hydrated.    Continue the rest of your medication as prescribed.    Follow up if your symptoms do not resolve.
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
## 2025-08-08 ENCOUNTER — TELEPHONE (OUTPATIENT)
Dept: SURGERY | Facility: CLINIC | Age: 70
End: 2025-08-08
Payer: MEDICARE

## 2025-08-28 ENCOUNTER — OFFICE VISIT (OUTPATIENT)
Dept: FAMILY MEDICINE CLINIC | Facility: CLINIC | Age: 70
End: 2025-08-28

## 2025-08-28 VITALS
OXYGEN SATURATION: 93 % | TEMPERATURE: 99 F | DIASTOLIC BLOOD PRESSURE: 58 MMHG | RESPIRATION RATE: 18 BRPM | SYSTOLIC BLOOD PRESSURE: 122 MMHG | HEIGHT: 64 IN | BODY MASS INDEX: 31 KG/M2 | HEART RATE: 90 BPM

## 2025-08-28 DIAGNOSIS — R09.89 LUNG CRACKLES: ICD-10-CM

## 2025-08-28 DIAGNOSIS — B96.89 ACUTE BACTERIAL SINUSITIS: Primary | ICD-10-CM

## 2025-08-28 DIAGNOSIS — J01.90 ACUTE BACTERIAL SINUSITIS: Primary | ICD-10-CM

## 2025-08-28 PROCEDURE — 99214 OFFICE O/P EST MOD 30 MIN: CPT

## 2025-08-28 PROCEDURE — 1159F MED LIST DOCD IN RCRD: CPT

## 2025-08-28 RX ORDER — CEFPODOXIME PROXETIL 200 MG/1
200 TABLET, FILM COATED ORAL 2 TIMES DAILY
Qty: 14 TABLET | Refills: 0 | Status: SHIPPED | OUTPATIENT
Start: 2025-08-28

## 2025-08-28 RX ORDER — CEFPODOXIME PROXETIL 200 MG/1
200 TABLET, FILM COATED ORAL 2 TIMES DAILY
Qty: 14 TABLET | Refills: 0 | Status: SHIPPED | OUTPATIENT
Start: 2025-08-28 | End: 2025-08-28

## 2025-08-28 RX ORDER — PREDNISONE 20 MG/1
40 TABLET ORAL DAILY
Qty: 10 TABLET | Refills: 0 | Status: SHIPPED | OUTPATIENT
Start: 2025-08-28 | End: 2025-09-02

## 2025-08-28 RX ORDER — CETIRIZINE HYDROCHLORIDE 10 MG/1
10 TABLET ORAL DAILY
COMMUNITY

## 2025-08-28 RX ORDER — PREDNISONE 20 MG/1
40 TABLET ORAL DAILY
Qty: 10 TABLET | Refills: 0 | Status: SHIPPED | OUTPATIENT
Start: 2025-08-28 | End: 2025-08-28

## (undated) DIAGNOSIS — Z76.0 MEDICATION REFILL: ICD-10-CM

## (undated) DEVICE — SNARE 9MM 230CM 2.4MM EXACTO

## (undated) DEVICE — KIT ENDO ORCAPOD 160/180/190

## (undated) DEVICE — 10FT COMBINED O2 DELIVERY/CO2 MONITORING. FILTER WITH MICROSTREAM TYPE LUER: Brand: DUAL ADULT NASAL CANNULA

## (undated) DEVICE — 3M™ RED DOT™ MONITORING ELECTRODE WITH FOAM TAPE AND STICKY GEL 2570-3, 3/BAG, 200/CASE, 54/PLT: Brand: RED DOT™

## (undated) DEVICE — BIOGUARD CLEANING ADAPTER

## (undated) DEVICE — TRAP SPEC REMOVAL ETRAP 15CM

## (undated) DEVICE — 1200CC GUARDIAN II: Brand: GUARDIAN

## (undated) DEVICE — ENDOSCOPY PACK - LOWER: Brand: MEDLINE INDUSTRIES, INC.

## (undated) NOTE — MR AVS SNAPSHOT
[facility] [address] [place] [address] [address] [facility]  [address]uz[address] [id number] [phone]               Thank you for choosing us for your health care visit with Ann Conn DO.   We are glad to serve you and happy to provide you wi If you are confident that your benefit plan will not require a referral or authorization, such as [place] Medicaid, please feel free to schedule your appointment immediately.  However, if you are unsure about the requirements for authorization, please wait of the sinus. Too much mucus may cause the cilia to stop working. This blocks the sinus opening. A buildup of fluid in the sinuses then causes pain and pressure. It can also encourage bacteria to grow in the sinuses.   Common symptoms of acute sinusitis  Yo chemical fumes can irritate the mucosa. Constant exposure to irritants can cause ongoing inflammation of this lining. It can also damage tiny hairlike cilia that cover the mucosa. Cilia help carry mucus toward the opening of the sinus.  Damage to cilia keep help control them. This plan may include medicines or allergy shots. · Having surgery. In some cases, you may need surgery on the nose, sinuses, or both. This can improve sinus drainage or remove nasal blockages.   Date Last Reviewed: 10/1/2016  © 2000-201 · Ask your healthcare provider about a referral to have an allergy evaluation. Or ask for a referral to see an allergy specialist.  Boost moisture  Keeping your sinuses moist makes your mucus thinner. This allows your sinuses to drain better.  And this help What changed:  Another medication with the same name was added. Make sure you understand how and when to take each. Commonly known as:  MEDROL           * methylPREDNISolone 4 MG Tbpk   As directed.    What changed:  Another medication with the same name [employer] [facility] [address] [place], [place] [address] for making healthy food choices  -   Enjoy your food, but eat less. Fully enjoy your food when eating. Don’t eat while distracted and slow down. Avoid over sized portions. Don’t eat while when you’re bored.

## (undated) NOTE — LETTER
05/16/18        [address] [id number]      Dear Matthew Wallis,    [address] [facility] records indicate that you have outstanding lab work and or testing that was ordered for you and has not yet been completed:          Comp Metabolic Panel (14)

## (undated) NOTE — LETTER
03/11/20        Yessi Henning [address] [facility] [id number]      Dear Joseph Phillip,    [address] [facility] records indicate that you have outstanding lab work and or testing that was ordered for you and has not yet been completed:  Orders Placed This Encounter